# Patient Record
Sex: MALE | Race: WHITE | NOT HISPANIC OR LATINO
[De-identification: names, ages, dates, MRNs, and addresses within clinical notes are randomized per-mention and may not be internally consistent; named-entity substitution may affect disease eponyms.]

---

## 2017-06-27 ENCOUNTER — APPOINTMENT (OUTPATIENT)
Dept: INFECTIOUS DISEASE | Facility: CLINIC | Age: 72
End: 2017-06-27

## 2017-06-27 VITALS
WEIGHT: 216 LBS | OXYGEN SATURATION: 95 % | HEART RATE: 62 BPM | TEMPERATURE: 97.3 F | SYSTOLIC BLOOD PRESSURE: 168 MMHG | DIASTOLIC BLOOD PRESSURE: 69 MMHG | HEIGHT: 70.5 IN | BODY MASS INDEX: 30.58 KG/M2

## 2017-06-27 DIAGNOSIS — Z87.891 PERSONAL HISTORY OF NICOTINE DEPENDENCE: ICD-10-CM

## 2017-06-27 DIAGNOSIS — Z86.79 PERSONAL HISTORY OF OTHER DISEASES OF THE CIRCULATORY SYSTEM: ICD-10-CM

## 2017-06-27 RX ORDER — TAMSULOSIN HYDROCHLORIDE 0.4 MG/1
CAPSULE ORAL
Refills: 0 | Status: ACTIVE | COMMUNITY

## 2017-06-27 RX ORDER — METOPROLOL TARTRATE 50 MG/1
TABLET ORAL
Refills: 0 | Status: ACTIVE | COMMUNITY

## 2017-06-27 RX ORDER — ASPIRIN 81 MG
81 TABLET, DELAYED RELEASE (ENTERIC COATED) ORAL
Refills: 0 | Status: ACTIVE | COMMUNITY

## 2017-06-29 ENCOUNTER — MEDICATION RENEWAL (OUTPATIENT)
Age: 72
End: 2017-06-29

## 2017-06-29 ENCOUNTER — RX RENEWAL (OUTPATIENT)
Age: 72
End: 2017-06-29

## 2017-07-28 ENCOUNTER — RX RENEWAL (OUTPATIENT)
Age: 72
End: 2017-07-28

## 2017-07-28 ENCOUNTER — MEDICATION RENEWAL (OUTPATIENT)
Age: 72
End: 2017-07-28

## 2017-08-23 ENCOUNTER — OUTPATIENT (OUTPATIENT)
Dept: OUTPATIENT SERVICES | Facility: HOSPITAL | Age: 72
LOS: 1 days | End: 2017-08-23
Payer: MEDICARE

## 2017-08-23 ENCOUNTER — APPOINTMENT (OUTPATIENT)
Dept: CT IMAGING | Facility: IMAGING CENTER | Age: 72
End: 2017-08-23
Payer: MEDICARE

## 2017-08-23 DIAGNOSIS — Z95.4 PRESENCE OF OTHER HEART-VALVE REPLACEMENT: Chronic | ICD-10-CM

## 2017-08-23 DIAGNOSIS — Z98.89 OTHER SPECIFIED POSTPROCEDURAL STATES: Chronic | ICD-10-CM

## 2017-08-23 DIAGNOSIS — Z00.8 ENCOUNTER FOR OTHER GENERAL EXAMINATION: ICD-10-CM

## 2017-08-23 PROCEDURE — G0297: CPT | Mod: 26

## 2017-08-23 PROCEDURE — G0297: CPT

## 2017-08-25 ENCOUNTER — APPOINTMENT (OUTPATIENT)
Dept: INFECTIOUS DISEASE | Facility: CLINIC | Age: 72
End: 2017-08-25
Payer: MEDICARE

## 2017-08-25 VITALS
SYSTOLIC BLOOD PRESSURE: 152 MMHG | TEMPERATURE: 97.1 F | HEART RATE: 67 BPM | DIASTOLIC BLOOD PRESSURE: 67 MMHG | BODY MASS INDEX: 29.85 KG/M2 | OXYGEN SATURATION: 94 % | RESPIRATION RATE: 16 BRPM | WEIGHT: 211 LBS

## 2017-08-25 PROCEDURE — 99214 OFFICE O/P EST MOD 30 MIN: CPT

## 2017-11-24 ENCOUNTER — RX RENEWAL (OUTPATIENT)
Age: 72
End: 2017-11-24

## 2017-12-06 ENCOUNTER — APPOINTMENT (OUTPATIENT)
Dept: INFECTIOUS DISEASE | Facility: CLINIC | Age: 72
End: 2017-12-06
Payer: MEDICARE

## 2017-12-06 VITALS
DIASTOLIC BLOOD PRESSURE: 65 MMHG | BODY MASS INDEX: 30.86 KG/M2 | HEART RATE: 63 BPM | HEIGHT: 70.5 IN | OXYGEN SATURATION: 94 % | RESPIRATION RATE: 18 BRPM | SYSTOLIC BLOOD PRESSURE: 143 MMHG | TEMPERATURE: 98.4 F | WEIGHT: 218 LBS

## 2017-12-06 PROCEDURE — 99213 OFFICE O/P EST LOW 20 MIN: CPT

## 2018-03-06 ENCOUNTER — APPOINTMENT (OUTPATIENT)
Dept: INFECTIOUS DISEASE | Facility: CLINIC | Age: 73
End: 2018-03-06
Payer: MEDICARE

## 2018-03-06 VITALS
HEIGHT: 60 IN | DIASTOLIC BLOOD PRESSURE: 63 MMHG | WEIGHT: 217 LBS | TEMPERATURE: 98.1 F | SYSTOLIC BLOOD PRESSURE: 146 MMHG | BODY MASS INDEX: 42.6 KG/M2 | HEART RATE: 70 BPM

## 2018-03-06 DIAGNOSIS — R76.11 NONSPECIFIC REACTION TO TUBERCULIN SKIN TEST W/OUT ACTIVE TUBERCULOSIS: ICD-10-CM

## 2018-03-06 PROCEDURE — 99214 OFFICE O/P EST MOD 30 MIN: CPT

## 2018-03-06 RX ORDER — ISONIAZID 300 MG/1
300 TABLET ORAL DAILY
Qty: 30 | Refills: 2 | Status: DISCONTINUED | COMMUNITY
Start: 2017-06-29 | End: 2018-03-06

## 2018-11-12 ENCOUNTER — APPOINTMENT (OUTPATIENT)
Dept: VASCULAR SURGERY | Facility: CLINIC | Age: 73
End: 2018-11-12

## 2018-12-05 ENCOUNTER — APPOINTMENT (OUTPATIENT)
Dept: VASCULAR SURGERY | Facility: CLINIC | Age: 73
End: 2018-12-05

## 2018-12-19 ENCOUNTER — APPOINTMENT (OUTPATIENT)
Dept: VASCULAR SURGERY | Facility: CLINIC | Age: 73
End: 2018-12-19

## 2019-04-26 ENCOUNTER — APPOINTMENT (OUTPATIENT)
Dept: ORTHOPEDIC SURGERY | Facility: CLINIC | Age: 74
End: 2019-04-26
Payer: MEDICARE

## 2019-04-26 VITALS
BODY MASS INDEX: 31.35 KG/M2 | DIASTOLIC BLOOD PRESSURE: 68 MMHG | HEIGHT: 70 IN | WEIGHT: 219 LBS | SYSTOLIC BLOOD PRESSURE: 173 MMHG | HEART RATE: 61 BPM

## 2019-04-26 DIAGNOSIS — M47.816 SPONDYLOSIS W/OUT MYELOPATHY OR RADICULOPATHY, LUMBAR REGION: ICD-10-CM

## 2019-04-26 DIAGNOSIS — M54.16 RADICULOPATHY, LUMBAR REGION: ICD-10-CM

## 2019-04-26 PROCEDURE — 99204 OFFICE O/P NEW MOD 45 MIN: CPT

## 2019-04-26 NOTE — DISCUSSION/SUMMARY
[de-identified] : Lumbar radiculopathy and signs and symptoms of spinal stenosis.\par We discussed all options.\par Sending for MRI lumbar spine. \par F/U 1 week to review. \par All options discussed including rest, medicine, home exercise, acupuncture, Chiropractic care, Physical Therapy, Pain management, and last resort surgery.\par All questions were answered, all alternatives discussed and the patient is in complete agreement with that plan. Follow-up appointment as instructed. Any issues and the patient will call or come in sooner. \par Lumbar radiculopathy and signs and symptoms of spinal stenosis.\par We discussed all options.\par Continue with Chiropractic care.\par Thank you for the consultation.

## 2019-04-26 NOTE — CONSULT LETTER
[Dear  ___] : Dear ~DENN[place], [Consult Letter:] : I had the pleasure of evaluating your patient, [unfilled]. [Please see my note below.] : Please see my note below. [Consult Closing:] : Thank you very much for allowing me to participate in the care of this patient.  If you have any questions, please do not hesitate to contact me. [Sincerely,] : Sincerely, [FreeTextEntry2] : MARNI DIXON\par \par Casey Camacho\par Chiropractor\par Address: [id number] [place] [address] [address] [place] [address]\par Phone: [phone] [FreeTextEntry3] : Levar Horan MD

## 2019-04-26 NOTE — PHYSICAL EXAM
[UE/LE] : Motor: 5/5 motor strength in bilateral upper & lower extremities [ALL] : dorsalis pedis, posterior tibial, femoral, popliteal, and radial 2+ and symmetric bilaterally [Normal] : Oriented to person, place, and time, insight and judgement were intact and the affect was normal [Poor Appearance] : well-appearing [Acute Distress] : not in acute distress [de-identified] : 5 out of 5 motor strength, sensation is intact and symmetrical full range of motion flexion extension and rotation, no palpatory tenderness full range of motion of hips knees shoulders and elbows (all four extremities), no atrophy, negative straight leg raise, no pathological reflexes, no swelling, normal ambulation, no apparent distress skin is intact, no palpable lymph nodes, no upper or lower extremity instability, alert and oriented x3 and normal mood. Normal finger-to nose test.  [de-identified] : AP/lat lumbar spine 04/26/2019:Revealed degenerative disc disease lumbar spine with a grade 1 spondylolisthesis at L4-5 with minimal hip arthritis-reviewed with the patient

## 2019-04-26 NOTE — ADDENDUM
[FreeTextEntry1] : This note was authored by Justine Garcia working as a medical scribe for Dr. Levar Horan. The note was reviewed, edited, and revised by Dr. Levar Horan whom is in agreement with the exam findings, imaging findings, and treatment plan. 04/26/2019.

## 2019-04-26 NOTE — HISTORY OF PRESENT ILLNESS
[Worsening] : worsening [de-identified] : Difficulty ambulating\par c/o LBP into both legs into the feet.\par Can't walk more than half mile. \par Chiropractic care helps little. \par He is getting worse and worse with decreased ambulation.\par Also c/o cervical spine into bilateral upper extremity radiculopathy - tingling and numbness. \par No fever chills sweats nausea vomiting no bowel or bladder dysfunction, no recent weight loss or gain no night pain. This history is in addition to the intake form that I personally reviewed.

## 2019-05-06 ENCOUNTER — APPOINTMENT (OUTPATIENT)
Dept: ORTHOPEDIC SURGERY | Facility: CLINIC | Age: 74
End: 2019-05-06
Payer: MEDICARE

## 2019-05-06 DIAGNOSIS — M54.6 PAIN IN THORACIC SPINE: ICD-10-CM

## 2019-05-06 DIAGNOSIS — M48.061 SPINAL STENOSIS, LUMBAR REGION WITHOUT NEUROGENIC CLAUDICATION: ICD-10-CM

## 2019-05-06 PROCEDURE — 99214 OFFICE O/P EST MOD 30 MIN: CPT

## 2019-05-06 NOTE — PHYSICAL EXAM
[UE/LE] : Sensory: Intact in bilateral upper & lower extremities [ALL] : dorsalis pedis, posterior tibial, femoral, popliteal, and radial 2+ and symmetric bilaterally [Normal] : Oriented to person, place, and time, insight and judgement were intact and the affect was normal [Poor Appearance] : well-appearing [Acute Distress] : not in acute distress [de-identified] : MRI lumbar at [facility] on 04/26/2019: Spinal malalignment measuring 9 mm with marked multilevel degenerative disc disease including herniations and synovial cyst. \par \par AP/lat lumbar spine 04/26/2019:Revealed degenerative disc disease lumbar spine with a grade 1 spondylolisthesis at L4-5 with minimal hip arthritis-reviewed with the patient  [de-identified] : 5 out of 5 motor strength, sensation is intact and symmetrical full range of motion flexion extension and rotation, no palpatory tenderness full range of motion of hips knees shoulders and elbows (all four extremities), no atrophy, negative straight leg raise, no pathological reflexes, no swelling, normal ambulation, no apparent distress skin is intact, no palpable lymph nodes, no upper or lower extremity instability, alert and oriented x3 and normal mood. Normal finger-to nose test. No upper motor neuron findings.

## 2019-05-06 NOTE — DISCUSSION/SUMMARY
[de-identified] : Lumbar radiculopathy and signs and symptoms of spinal stenosis.\par Referred to  Dr. Bass and Poonam/Fabio for KOURTNEY. \par 5 is now developing mid back pain arm pain neck pain I would like him to be evaluated by a neurologist. He has no signs or symptoms of myelopathy.\par We discussed all options.\par All options discussed including rest, medicine, home exercise, acupuncture, Chiropractic care, Physical Therapy, Pain management, and last resort surgery.\par All questions were answered, all alternatives discussed and the patient is in complete agreement with that plan. Follow-up appointment as instructed. Any issues and the patient will call or come in sooner. \par Lumbar radiculopathy and signs and symptoms of spinal stenosis.\par We discussed all options.\par Followup after the neurologic evaluation which will probably be followed by the pain management consultation.\par Thank you for the consultation.

## 2019-05-06 NOTE — ADDENDUM
[FreeTextEntry1] : This note was authored by Justine Garcia working as a medical scribe for Dr. Levar Horan. The note was reviewed, edited, and revised by Dr. Levar Horan whom is in agreement with the exam findings, imaging findings, and treatment plan. 05/06/2019.

## 2019-05-06 NOTE — HISTORY OF PRESENT ILLNESS
[Stable] : stable [de-identified] : Here to review lumbar MRI\par Difficulty ambulating. \par c/o LBP into both legs into the feet.\par Can't walk more than half mile. \par Chiropractic care helps little. \par He is getting worse and worse with decreased ambulation.\par Also c/o cervical spine into bilateral upper extremity radiculopathy - tingling and numbness. \par Not currently on an anticoagulant. \par Recent dx with bladder CA. \par No fever chills sweats nausea vomiting no bowel or bladder dysfunction, no recent weight loss or gain no night pain. This history is in addition to the intake form that I personally reviewed.

## 2019-06-24 ENCOUNTER — APPOINTMENT (OUTPATIENT)
Dept: ORTHOPEDIC SURGERY | Facility: CLINIC | Age: 74
End: 2019-06-24
Payer: MEDICARE

## 2019-06-24 VITALS
HEART RATE: 99 BPM | HEIGHT: 70 IN | BODY MASS INDEX: 30.06 KG/M2 | SYSTOLIC BLOOD PRESSURE: 146 MMHG | WEIGHT: 210 LBS | DIASTOLIC BLOOD PRESSURE: 96 MMHG

## 2019-06-24 PROCEDURE — 99214 OFFICE O/P EST MOD 30 MIN: CPT

## 2019-07-29 ENCOUNTER — APPOINTMENT (OUTPATIENT)
Dept: ORTHOPEDIC SURGERY | Facility: CLINIC | Age: 74
End: 2019-07-29

## 2019-09-04 ENCOUNTER — APPOINTMENT (OUTPATIENT)
Dept: ORTHOPEDIC SURGERY | Facility: CLINIC | Age: 74
End: 2019-09-04

## 2019-12-06 ENCOUNTER — APPOINTMENT (OUTPATIENT)
Dept: ORTHOPEDIC SURGERY | Facility: CLINIC | Age: 74
End: 2019-12-06
Payer: MEDICARE

## 2019-12-06 PROCEDURE — 99214 OFFICE O/P EST MOD 30 MIN: CPT

## 2019-12-06 NOTE — HISTORY OF PRESENT ILLNESS
[Worsening] : worsening [de-identified] : 73 y/o male presents for follow up of Lumbar radiculopathy/stenosis. Patient was previously referred to Dr. Bass and Poonam/Fabio for KOURTNEY. He states he saw Dr. Bass and Poonam. He states he was referred for lumbar MRI at [facility]. Patient states he has not had any epidural injections. 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 He had chemo in June and July. \par He states that this has been ongoing but his oncologist has cleared him for spinal surgery.\par He presents to discuss the procedure.\par Hx: Lumbar MRI done.\par Difficulty ambulating. \par getting worse\par cleared after bladder cancer\par c/o LBP into both legs into the feet.\par Can't walk more than half mile. \par Chiropractic care helps little. \par He is getting worse and worse with decreased ambulation.\par Also c/o cervical spine into bilateral upper extremity radiculopathy - tingling and numbness. \par Not currently on an anticoagulant. \par Recent dx with bladder CA. Cleared.\par No fever chills sweats nausea vomiting no bowel or bladder dysfunction, no recent weight loss or gain no night pain. This history is in addition to the intake form that I personally reviewed. \par \par He states the symptoms are stable. \par

## 2019-12-06 NOTE — PHYSICAL EXAM
[Antalgic] : antalgic [UE/LE] : Motor: 5/5 motor strength in bilateral upper & lower extremities [ALL] : Biceps, brachioradialis, triceps, patellar, ankle and plantar 2+ and symmetric bilaterally [Normal] : Oriented to person, place, and time, insight and judgement were intact and the affect was normal [Acute Distress] : not in acute distress [Poor Appearance] : well-appearing [de-identified] : 5 out of 5 motor strength, sensation is intact and symmetrical full range of motion flexion extension and rotation, no palpatory tenderness full range of motion of hips knees shoulders and elbows (all four extremities), no atrophy, negative straight leg raise, no pathological reflexes, no swelling, normal ambulation, no apparent distress skin is intact, no palpable lymph nodes, no upper or lower extremity instability, alert and oriented x3 and normal mood. Normal finger-to nose test.  [de-identified] : MRI lumbar at [facility] on 04/26/2019: Spinal malalignment measuring 9 mm with marked multilevel degenerative disc disease including herniations and synovial cyst. \par \par AP/lat lumbar spine 04/26/2019:Revealed degenerative disc disease lumbar spine with a grade 1 spondylolisthesis at L4-5 with minimal hip arthritis-reviewed with the patient. \par

## 2019-12-06 NOTE — DISCUSSION/SUMMARY
[Surgical risks reviewed] : Surgical risks reviewed [de-identified] : Patient is suffering from lumbar spinal stenosis.\par He has decreased ambulation.\par He is miserable and worsening.\par He would like to undergo surgical intervention.\par He is undergoing chemotherapy for bladder cancer.\par He would need clearance from his oncologist for when he can undergo surgery.\par This maybe weeks after chemotherapy to decrease the chance of infection.\par Follow up in 6 weeks.\par Surgery will involve an L3-5 decompression.\par Risks of surgery include infection, dural tear, nerve root injury, reherniation, future back pain, future leg pain, retained fragment, hematoma, urinary retention, worsening leg symptoms, footdrop, anesthetic risks, blood transfusion risks, positioning pain, visceral and vascular injury, deep vein thrombosis, pulmonary embolus, and death. Patient will need spinal orthosis pre and post operatively. All risks were explained not exclusive to the ones mentioned alternatives were discussed and all questions were answered the patient agrees and understands the above and is in complete agreement with the plan. \par All questions were answered, all alternatives discussed and the patient is in complete agreement with that plan. Follow-up appointment as instructed. Any issues and the patient will call or come in sooner. \par

## 2019-12-06 NOTE — ADDENDUM
[FreeTextEntry1] : This note was authored by Justine Garcia working as a medical scribe for Dr. Levar Horan. The note was reviewed, edited, and revised by Dr. Levar Horan whom is in agreement with the exam findings, imaging findings, and treatment plan. Dec 06, 2019

## 2020-01-14 ENCOUNTER — OUTPATIENT (OUTPATIENT)
Dept: OUTPATIENT SERVICES | Facility: HOSPITAL | Age: 75
LOS: 1 days | End: 2020-01-14

## 2020-01-14 VITALS
DIASTOLIC BLOOD PRESSURE: 76 MMHG | WEIGHT: 210.1 LBS | RESPIRATION RATE: 16 BRPM | OXYGEN SATURATION: 98 % | TEMPERATURE: 98 F | SYSTOLIC BLOOD PRESSURE: 130 MMHG | HEIGHT: 70 IN

## 2020-01-14 DIAGNOSIS — M48.061 SPINAL STENOSIS, LUMBAR REGION WITHOUT NEUROGENIC CLAUDICATION: ICD-10-CM

## 2020-01-14 DIAGNOSIS — Z01.818 ENCOUNTER FOR OTHER PREPROCEDURAL EXAMINATION: ICD-10-CM

## 2020-01-14 DIAGNOSIS — Z95.2 PRESENCE OF PROSTHETIC HEART VALVE: ICD-10-CM

## 2020-01-14 DIAGNOSIS — Z98.89 OTHER SPECIFIED POSTPROCEDURAL STATES: Chronic | ICD-10-CM

## 2020-01-14 DIAGNOSIS — G47.30 SLEEP APNEA, UNSPECIFIED: ICD-10-CM

## 2020-01-14 DIAGNOSIS — M48.07 SPINAL STENOSIS, LUMBOSACRAL REGION: ICD-10-CM

## 2020-01-14 DIAGNOSIS — Z95.4 PRESENCE OF OTHER HEART-VALVE REPLACEMENT: Chronic | ICD-10-CM

## 2020-01-14 DIAGNOSIS — C67.9 MALIGNANT NEOPLASM OF BLADDER, UNSPECIFIED: Chronic | ICD-10-CM

## 2020-01-14 LAB
ANION GAP SERPL CALC-SCNC: 15 MMO/L — HIGH (ref 7–14)
BLD GP AB SCN SERPL QL: NEGATIVE — SIGNIFICANT CHANGE UP
BUN SERPL-MCNC: 18 MG/DL — SIGNIFICANT CHANGE UP (ref 7–23)
CALCIUM SERPL-MCNC: 9.8 MG/DL — SIGNIFICANT CHANGE UP (ref 8.4–10.5)
CHLORIDE SERPL-SCNC: 103 MMOL/L — SIGNIFICANT CHANGE UP (ref 98–107)
CO2 SERPL-SCNC: 23 MMOL/L — SIGNIFICANT CHANGE UP (ref 22–31)
CREAT SERPL-MCNC: 1.2 MG/DL — SIGNIFICANT CHANGE UP (ref 0.5–1.3)
GLUCOSE SERPL-MCNC: 93 MG/DL — SIGNIFICANT CHANGE UP (ref 70–99)
HCT VFR BLD CALC: 43.3 % — SIGNIFICANT CHANGE UP (ref 39–50)
HGB BLD-MCNC: 13.9 G/DL — SIGNIFICANT CHANGE UP (ref 13–17)
MCHC RBC-ENTMCNC: 30 PG — SIGNIFICANT CHANGE UP (ref 27–34)
MCHC RBC-ENTMCNC: 32.1 % — SIGNIFICANT CHANGE UP (ref 32–36)
MCV RBC AUTO: 93.3 FL — SIGNIFICANT CHANGE UP (ref 80–100)
NRBC # FLD: 0 K/UL — SIGNIFICANT CHANGE UP (ref 0–0)
PLATELET # BLD AUTO: 137 K/UL — LOW (ref 150–400)
PMV BLD: 12.5 FL — SIGNIFICANT CHANGE UP (ref 7–13)
POTASSIUM SERPL-MCNC: 4.5 MMOL/L — SIGNIFICANT CHANGE UP (ref 3.5–5.3)
POTASSIUM SERPL-SCNC: 4.5 MMOL/L — SIGNIFICANT CHANGE UP (ref 3.5–5.3)
RBC # BLD: 4.64 M/UL — SIGNIFICANT CHANGE UP (ref 4.2–5.8)
RBC # FLD: 14 % — SIGNIFICANT CHANGE UP (ref 10.3–14.5)
RH IG SCN BLD-IMP: NEGATIVE — SIGNIFICANT CHANGE UP
SODIUM SERPL-SCNC: 141 MMOL/L — SIGNIFICANT CHANGE UP (ref 135–145)
WBC # BLD: 5.57 K/UL — SIGNIFICANT CHANGE UP (ref 3.8–10.5)
WBC # FLD AUTO: 5.57 K/UL — SIGNIFICANT CHANGE UP (ref 3.8–10.5)

## 2020-01-14 NOTE — H&P PST ADULT - ASSESSMENT
73 y/o male with PMH bladder cancer, s/p TURBT x3, in 2019, and course of chemo which completed 7/2019.  Pt with hx of lower back pain since 1975, worsened in past year. Dx with lumbar spinal stenosis, and spondylolisthesis.  Scheduled for L3-L4, L4-L5 Lumbar Laminectomy with fusion 1/28/20.

## 2020-01-14 NOTE — H&P PST ADULT - CARDIOVASCULAR COMMENTS
Hx of aortic valve replacement with bovine valve 2010.  Pt on low dose ASA.   I soke with Dr. Aung Rao's office.  Dr. Horan would prefer pt to come off of his ASA prior to surgery, final decision is up to cardiologist per Maira.  Pt to see Dr. Johnson prior to surgery

## 2020-01-14 NOTE — H&P PST ADULT - NSICDXFAMILYHX_GEN_ALL_CORE_FT
FAMILY HISTORY:  FH: stroke, mother had stroke, cerebral hemorrhage.  brother had cerebral hemorrhage

## 2020-01-14 NOTE — H&P PST ADULT - HISTORY OF PRESENT ILLNESS
75 y/o male with PMH bladder cancer, s/p TURBT x3, in 2019, and course of chemo which completed 7/2019.  Pt with hx of lower back pain since 1975, worsened in past year. Dx with lumbar spinal stenosis, and spondylolisthesis.  Scheduled for L3-L4, L4-L5 Lumbar Laminectomy with fusion 1/28/20.

## 2020-01-14 NOTE — H&P PST ADULT - VISION (WITH CORRECTIVE LENSES IF THE PATIENT USUALLY WEARS THEM):
Normal vision: sees adequately in most situations; can see medication labels, newsprint/wars glasses

## 2020-01-14 NOTE — H&P PST ADULT - NSICDXPASTSURGICALHX_GEN_ALL_CORE_FT
PAST SURGICAL HISTORY:  Aortic valve replaced Bovine - Walters Model # 2700TFX  2012    S/P colon resection 2000 - Diverticulitis PAST SURGICAL HISTORY:  Aortic valve replaced Bovine - Walters Model # [id number]  2012    Bladder cancer TURBT x3 2019    S/P colon resection 2000 - Diverticulitis

## 2020-01-14 NOTE — H&P PST ADULT - NSICDXPASTMEDICALHX_GEN_ALL_CORE_FT
PAST MEDICAL HISTORY:  Aortic valve defect aortic valve replacement 2010 (bovine)    Cervical disc disorder     Cholelithiasis     Diverticulitis     GERD (gastroesophageal reflux disease)     HLD (hyperlipidemia)     HTN (hypertension)     Lumbar disc disease     Obesity     Pancreatitis Hospitalized 2009    Sleep apnea syndrome     Smoking history quit 2012 - 54 yrs PAST MEDICAL HISTORY:  Aortic valve defect aortic valve replacement 2010 (bovine)    Cervical disc disorder     Cholelithiasis     Chronic GERD     Diverticulitis     GERD (gastroesophageal reflux disease)     HLD (hyperlipidemia)     HTN (hypertension)     Lumbar disc disease     Obesity     Obesity     Pancreatitis Hospitalized 2009    Sleep apnea syndrome     Smoking history quit 2012 - 54 yrs

## 2020-01-14 NOTE — H&P PST ADULT - NEGATIVE GENERAL GENITOURINARY SYMPTOMS
no dysuria/no hematuria/normal urinary frequency no hematuria/no flank pain L/no dysuria/normal urinary frequency/no flank pain R

## 2020-01-14 NOTE — H&P PST ADULT - NSICDXPROBLEM_GEN_ALL_CORE_FT
PROBLEM DIAGNOSES  Problem: Spinal stenosis, lumbar  Assessment and Plan:  L3-L4, L4-L5 Lumbar Laminectomy with fusion 1/28/20.   CBc BMP T&S nasal culture  Preop instructions and antibacterial soap given and explained (verbal and written), with teach back.   Pt reports he has appt with pMD for pre-op as instructed by Surgeon    Problem: H/O aortic valve replacement  Assessment and Plan: OR booking informed.   Pt to see Cardiolgoist for pre-op eval and for instructions f\regarding pre-op asa.  I spoke with Dr. Aung Rao's office.  Dr. Horan would prefer pt to come off of ASA prior to surgery, final decision is up to Cardiolgoist, per Maira.  Pt to sched appt with dr. Johnson    Problem: Sleep apnea  Assessment and Plan: OR booking informed

## 2020-01-16 LAB — SPECIMEN SOURCE: SIGNIFICANT CHANGE UP

## 2020-01-17 LAB — BACTERIA NPH CULT: SIGNIFICANT CHANGE UP

## 2020-01-27 NOTE — ASU PATIENT PROFILE, ADULT - PSH
Aortic valve replaced  Bovine - Walters Model # 2700TFX  2012  Bladder cancer  TURBT x3 2019  S/P colon resection  2000 - Diverticulitis

## 2020-01-27 NOTE — ASU PATIENT PROFILE, ADULT - VISION (WITH CORRECTIVE LENSES IF THE PATIENT USUALLY WEARS THEM):
Normal vision: sees adequately in most situations; can see medication labels, newsprint/wars glasses Partially impaired: cannot see medication labels or newsprint, but can see obstacles in path, and the surrounding layout; can count fingers at arm's length/wears glasses

## 2020-01-27 NOTE — ASU PATIENT PROFILE, ADULT - PMH
Aortic valve defect  aortic valve replacement 2010 (bovine)  Cervical disc disorder    Cholelithiasis    Chronic GERD    Diverticulitis    GERD (gastroesophageal reflux disease)    HLD (hyperlipidemia)    HTN (hypertension)    Lumbar disc disease    Obesity    Obesity    Pancreatitis  Hospitalized 2009  Sleep apnea syndrome    Smoking history  quit 2012 - 54 yrs

## 2020-01-28 ENCOUNTER — APPOINTMENT (OUTPATIENT)
Dept: ORTHOPEDIC SURGERY | Facility: HOSPITAL | Age: 75
End: 2020-01-28
Payer: MEDICARE

## 2020-01-28 ENCOUNTER — INPATIENT (INPATIENT)
Facility: HOSPITAL | Age: 75
LOS: 1 days | Discharge: HOME | End: 2020-01-30
Attending: ORTHOPAEDIC SURGERY | Admitting: ORTHOPAEDIC SURGERY
Payer: MEDICARE

## 2020-01-28 ENCOUNTER — RESULT REVIEW (OUTPATIENT)
Age: 75
End: 2020-01-28

## 2020-01-28 VITALS
SYSTOLIC BLOOD PRESSURE: 135 MMHG | HEIGHT: 70 IN | RESPIRATION RATE: 16 BRPM | DIASTOLIC BLOOD PRESSURE: 55 MMHG | WEIGHT: 210.1 LBS | TEMPERATURE: 99 F | OXYGEN SATURATION: 93 % | HEART RATE: 79 BPM

## 2020-01-28 DIAGNOSIS — Z98.89 OTHER SPECIFIED POSTPROCEDURAL STATES: Chronic | ICD-10-CM

## 2020-01-28 DIAGNOSIS — C67.9 MALIGNANT NEOPLASM OF BLADDER, UNSPECIFIED: Chronic | ICD-10-CM

## 2020-01-28 DIAGNOSIS — M48.07 SPINAL STENOSIS, LUMBOSACRAL REGION: ICD-10-CM

## 2020-01-28 DIAGNOSIS — Z95.4 PRESENCE OF OTHER HEART-VALVE REPLACEMENT: Chronic | ICD-10-CM

## 2020-01-28 LAB
ANION GAP SERPL CALC-SCNC: 14 MMO/L — SIGNIFICANT CHANGE UP (ref 7–14)
BASOPHILS # BLD AUTO: 0.03 K/UL — SIGNIFICANT CHANGE UP (ref 0–0.2)
BASOPHILS NFR BLD AUTO: 0.2 % — SIGNIFICANT CHANGE UP (ref 0–2)
BUN SERPL-MCNC: 27 MG/DL — HIGH (ref 7–23)
CALCIUM SERPL-MCNC: 8.3 MG/DL — LOW (ref 8.4–10.5)
CHLORIDE SERPL-SCNC: 102 MMOL/L — SIGNIFICANT CHANGE UP (ref 98–107)
CO2 SERPL-SCNC: 20 MMOL/L — LOW (ref 22–31)
CREAT SERPL-MCNC: 1.8 MG/DL — HIGH (ref 0.5–1.3)
EOSINOPHIL # BLD AUTO: 0.01 K/UL — SIGNIFICANT CHANGE UP (ref 0–0.5)
EOSINOPHIL NFR BLD AUTO: 0.1 % — SIGNIFICANT CHANGE UP (ref 0–6)
GLUCOSE SERPL-MCNC: 166 MG/DL — HIGH (ref 70–99)
HCT VFR BLD CALC: 44.1 % — SIGNIFICANT CHANGE UP (ref 39–50)
HGB BLD-MCNC: 14.1 G/DL — SIGNIFICANT CHANGE UP (ref 13–17)
IMM GRANULOCYTES NFR BLD AUTO: 0.6 % — SIGNIFICANT CHANGE UP (ref 0–1.5)
LYMPHOCYTES # BLD AUTO: 0.5 K/UL — LOW (ref 1–3.3)
LYMPHOCYTES # BLD AUTO: 3.6 % — LOW (ref 13–44)
MCHC RBC-ENTMCNC: 29.9 PG — SIGNIFICANT CHANGE UP (ref 27–34)
MCHC RBC-ENTMCNC: 32 % — SIGNIFICANT CHANGE UP (ref 32–36)
MCV RBC AUTO: 93.6 FL — SIGNIFICANT CHANGE UP (ref 80–100)
MONOCYTES # BLD AUTO: 0.13 K/UL — SIGNIFICANT CHANGE UP (ref 0–0.9)
MONOCYTES NFR BLD AUTO: 0.9 % — LOW (ref 2–14)
NEUTROPHILS # BLD AUTO: 13.22 K/UL — HIGH (ref 1.8–7.4)
NEUTROPHILS NFR BLD AUTO: 94.6 % — HIGH (ref 43–77)
NRBC # FLD: 0 K/UL — SIGNIFICANT CHANGE UP (ref 0–0)
PLATELET # BLD AUTO: 193 K/UL — SIGNIFICANT CHANGE UP (ref 150–400)
PMV BLD: 12.9 FL — SIGNIFICANT CHANGE UP (ref 7–13)
POTASSIUM SERPL-MCNC: 4.6 MMOL/L — SIGNIFICANT CHANGE UP (ref 3.5–5.3)
POTASSIUM SERPL-SCNC: 4.6 MMOL/L — SIGNIFICANT CHANGE UP (ref 3.5–5.3)
RBC # BLD: 4.71 M/UL — SIGNIFICANT CHANGE UP (ref 4.2–5.8)
RBC # FLD: 13.9 % — SIGNIFICANT CHANGE UP (ref 10.3–14.5)
SODIUM SERPL-SCNC: 136 MMOL/L — SIGNIFICANT CHANGE UP (ref 135–145)
WBC # BLD: 13.97 K/UL — HIGH (ref 3.8–10.5)
WBC # FLD AUTO: 13.97 K/UL — HIGH (ref 3.8–10.5)

## 2020-01-28 PROCEDURE — 63047 LAM FACETEC & FORAMOT LUMBAR: CPT | Mod: 59

## 2020-01-28 PROCEDURE — 22840 INSERT SPINE FIXATION DEVICE: CPT

## 2020-01-28 PROCEDURE — 72100 X-RAY EXAM L-S SPINE 2/3 VWS: CPT | Mod: 26

## 2020-01-28 PROCEDURE — 88311 DECALCIFY TISSUE: CPT | Mod: 26

## 2020-01-28 PROCEDURE — 63267 EXCISE INTRSPINL LESION LMBR: CPT | Mod: 82

## 2020-01-28 PROCEDURE — 63048 LAM FACETEC &FORAMOT EA ADDL: CPT | Mod: 82,59

## 2020-01-28 PROCEDURE — 63048 LAM FACETEC &FORAMOT EA ADDL: CPT

## 2020-01-28 PROCEDURE — 63047 LAM FACETEC & FORAMOT LUMBAR: CPT | Mod: 82,59

## 2020-01-28 PROCEDURE — 22840 INSERT SPINE FIXATION DEVICE: CPT | Mod: 82,59

## 2020-01-28 PROCEDURE — 88304 TISSUE EXAM BY PATHOLOGIST: CPT | Mod: 26

## 2020-01-28 PROCEDURE — 22612 ARTHRD PST TQ 1NTRSPC LUMBAR: CPT

## 2020-01-28 PROCEDURE — 63267 EXCISE INTRSPINL LESION LMBR: CPT

## 2020-01-28 PROCEDURE — 22612 ARTHRD PST TQ 1NTRSPC LUMBAR: CPT | Mod: 82

## 2020-01-28 RX ORDER — PANTOPRAZOLE SODIUM 20 MG/1
40 TABLET, DELAYED RELEASE ORAL
Refills: 0 | Status: DISCONTINUED | OUTPATIENT
Start: 2020-01-28 | End: 2020-01-30

## 2020-01-28 RX ORDER — POLYETHYLENE GLYCOL 3350 17 G/17G
17 POWDER, FOR SOLUTION ORAL EVERY 12 HOURS
Refills: 0 | Status: DISCONTINUED | OUTPATIENT
Start: 2020-01-28 | End: 2020-01-30

## 2020-01-28 RX ORDER — DIAZEPAM 5 MG
5 TABLET ORAL EVERY 8 HOURS
Refills: 0 | Status: DISCONTINUED | OUTPATIENT
Start: 2020-01-28 | End: 2020-01-28

## 2020-01-28 RX ORDER — CEFAZOLIN SODIUM 1 G
2000 VIAL (EA) INJECTION EVERY 8 HOURS
Refills: 0 | Status: COMPLETED | OUTPATIENT
Start: 2020-01-28 | End: 2020-01-28

## 2020-01-28 RX ORDER — LISINOPRIL 2.5 MG/1
20 TABLET ORAL DAILY
Refills: 0 | Status: DISCONTINUED | OUTPATIENT
Start: 2020-01-28 | End: 2020-01-28

## 2020-01-28 RX ORDER — AMLODIPINE BESYLATE 2.5 MG/1
5 TABLET ORAL DAILY
Refills: 0 | Status: DISCONTINUED | OUTPATIENT
Start: 2020-01-28 | End: 2020-01-30

## 2020-01-28 RX ORDER — HYDROMORPHONE HYDROCHLORIDE 2 MG/ML
30 INJECTION INTRAMUSCULAR; INTRAVENOUS; SUBCUTANEOUS
Refills: 0 | Status: DISCONTINUED | OUTPATIENT
Start: 2020-01-28 | End: 2020-01-29

## 2020-01-28 RX ORDER — MAGNESIUM HYDROXIDE 400 MG/1
30 TABLET, CHEWABLE ORAL EVERY 12 HOURS
Refills: 0 | Status: DISCONTINUED | OUTPATIENT
Start: 2020-01-28 | End: 2020-01-28

## 2020-01-28 RX ORDER — NALOXONE HYDROCHLORIDE 4 MG/.1ML
0.1 SPRAY NASAL
Refills: 0 | Status: DISCONTINUED | OUTPATIENT
Start: 2020-01-28 | End: 2020-01-30

## 2020-01-28 RX ORDER — ASPIRIN/CALCIUM CARB/MAGNESIUM 324 MG
81 TABLET ORAL DAILY
Refills: 0 | Status: DISCONTINUED | OUTPATIENT
Start: 2020-01-29 | End: 2020-01-30

## 2020-01-28 RX ORDER — AMLODIPINE BESYLATE 2.5 MG/1
5 TABLET ORAL DAILY
Refills: 0 | Status: DISCONTINUED | OUTPATIENT
Start: 2020-01-28 | End: 2020-01-28

## 2020-01-28 RX ORDER — FENTANYL CITRATE 50 UG/ML
50 INJECTION INTRAVENOUS
Refills: 0 | Status: DISCONTINUED | OUTPATIENT
Start: 2020-01-28 | End: 2020-01-28

## 2020-01-28 RX ORDER — ALBUMIN HUMAN 25 %
250 VIAL (ML) INTRAVENOUS ONCE
Refills: 0 | Status: COMPLETED | OUTPATIENT
Start: 2020-01-28 | End: 2020-01-28

## 2020-01-28 RX ORDER — ONDANSETRON 8 MG/1
4 TABLET, FILM COATED ORAL EVERY 6 HOURS
Refills: 0 | Status: DISCONTINUED | OUTPATIENT
Start: 2020-01-28 | End: 2020-01-30

## 2020-01-28 RX ORDER — ATORVASTATIN CALCIUM 80 MG/1
10 TABLET, FILM COATED ORAL AT BEDTIME
Refills: 0 | Status: DISCONTINUED | OUTPATIENT
Start: 2020-01-28 | End: 2020-01-30

## 2020-01-28 RX ORDER — SENNA PLUS 8.6 MG/1
2 TABLET ORAL AT BEDTIME
Refills: 0 | Status: DISCONTINUED | OUTPATIENT
Start: 2020-01-28 | End: 2020-01-30

## 2020-01-28 RX ORDER — ACETAMINOPHEN 500 MG
650 TABLET ORAL EVERY 6 HOURS
Refills: 0 | Status: DISCONTINUED | OUTPATIENT
Start: 2020-01-28 | End: 2020-01-30

## 2020-01-28 RX ORDER — SODIUM CHLORIDE 9 MG/ML
1000 INJECTION, SOLUTION INTRAVENOUS
Refills: 0 | Status: DISCONTINUED | OUTPATIENT
Start: 2020-01-28 | End: 2020-01-29

## 2020-01-28 RX ORDER — ACETAMINOPHEN 500 MG
650 TABLET ORAL EVERY 6 HOURS
Refills: 0 | Status: DISCONTINUED | OUTPATIENT
Start: 2020-01-28 | End: 2020-01-28

## 2020-01-28 RX ORDER — SODIUM CHLORIDE 9 MG/ML
1000 INJECTION, SOLUTION INTRAVENOUS ONCE
Refills: 0 | Status: COMPLETED | OUTPATIENT
Start: 2020-01-28 | End: 2020-01-28

## 2020-01-28 RX ORDER — HYDROMORPHONE HYDROCHLORIDE 2 MG/ML
0.5 INJECTION INTRAMUSCULAR; INTRAVENOUS; SUBCUTANEOUS
Refills: 0 | Status: DISCONTINUED | OUTPATIENT
Start: 2020-01-28 | End: 2020-01-28

## 2020-01-28 RX ORDER — SODIUM CHLORIDE 9 MG/ML
1000 INJECTION, SOLUTION INTRAVENOUS
Refills: 0 | Status: DISCONTINUED | OUTPATIENT
Start: 2020-01-28 | End: 2020-01-28

## 2020-01-28 RX ORDER — TAMSULOSIN HYDROCHLORIDE 0.4 MG/1
0.4 CAPSULE ORAL AT BEDTIME
Refills: 0 | Status: DISCONTINUED | OUTPATIENT
Start: 2020-01-28 | End: 2020-01-30

## 2020-01-28 RX ORDER — HYDROMORPHONE HYDROCHLORIDE 2 MG/ML
0.5 INJECTION INTRAMUSCULAR; INTRAVENOUS; SUBCUTANEOUS
Refills: 0 | Status: DISCONTINUED | OUTPATIENT
Start: 2020-01-28 | End: 2020-01-29

## 2020-01-28 RX ORDER — LISINOPRIL 2.5 MG/1
20 TABLET ORAL DAILY
Refills: 0 | Status: DISCONTINUED | OUTPATIENT
Start: 2020-01-28 | End: 2020-01-29

## 2020-01-28 RX ORDER — ONDANSETRON 8 MG/1
4 TABLET, FILM COATED ORAL ONCE
Refills: 0 | Status: DISCONTINUED | OUTPATIENT
Start: 2020-01-28 | End: 2020-01-28

## 2020-01-28 RX ORDER — FENTANYL CITRATE 50 UG/ML
25 INJECTION INTRAVENOUS
Refills: 0 | Status: DISCONTINUED | OUTPATIENT
Start: 2020-01-28 | End: 2020-01-28

## 2020-01-28 RX ADMIN — HYDROMORPHONE HYDROCHLORIDE 30 MILLILITER(S): 2 INJECTION INTRAMUSCULAR; INTRAVENOUS; SUBCUTANEOUS at 17:33

## 2020-01-28 RX ADMIN — Medication 100 MILLIGRAM(S): at 23:15

## 2020-01-28 RX ADMIN — HYDROMORPHONE HYDROCHLORIDE 30 MILLILITER(S): 2 INJECTION INTRAMUSCULAR; INTRAVENOUS; SUBCUTANEOUS at 20:18

## 2020-01-28 RX ADMIN — SODIUM CHLORIDE 100 MILLILITER(S): 9 INJECTION, SOLUTION INTRAVENOUS at 17:39

## 2020-01-28 RX ADMIN — Medication 650 MILLIGRAM(S): at 23:15

## 2020-01-28 RX ADMIN — SENNA PLUS 2 TABLET(S): 8.6 TABLET ORAL at 23:15

## 2020-01-28 RX ADMIN — Medication 250 MILLILITER(S): at 13:19

## 2020-01-28 RX ADMIN — ATORVASTATIN CALCIUM 10 MILLIGRAM(S): 80 TABLET, FILM COATED ORAL at 23:15

## 2020-01-28 RX ADMIN — Medication 100 MILLIGRAM(S): at 15:52

## 2020-01-28 RX ADMIN — TAMSULOSIN HYDROCHLORIDE 0.4 MILLIGRAM(S): 0.4 CAPSULE ORAL at 23:15

## 2020-01-28 RX ADMIN — SODIUM CHLORIDE 1000 MILLILITER(S): 9 INJECTION, SOLUTION INTRAVENOUS at 13:20

## 2020-01-28 RX ADMIN — HYDROMORPHONE HYDROCHLORIDE 30 MILLILITER(S): 2 INJECTION INTRAMUSCULAR; INTRAVENOUS; SUBCUTANEOUS at 12:50

## 2020-01-28 RX ADMIN — POLYETHYLENE GLYCOL 3350 17 GRAM(S): 17 POWDER, FOR SOLUTION ORAL at 17:35

## 2020-01-28 RX ADMIN — Medication 650 MILLIGRAM(S): at 17:36

## 2020-01-28 NOTE — PROGRESS NOTE ADULT - SUBJECTIVE AND OBJECTIVE BOX
Patient seen and examined in the PACU.  No current complaints.  Surgical pain is controlled.  Denies radiation of pain or paresthesia to the bilateral lower extremities.  Denies CP, SOB, N/V/HA.         HEALTH ISSUES - PROBLEM Dx:  Sleep apnea  H/O aortic valve replacement  Spinal stenosis, lumbar          MEDICATIONS  (STANDING):  acetaminophen   Tablet .. 650 milliGRAM(s) Oral every 6 hours  amLODIPine   Tablet 5 milliGRAM(s) Oral daily  atorvastatin 10 milliGRAM(s) Oral at bedtime  ceFAZolin   IVPB 2000 milliGRAM(s) IV Intermittent every 8 hours  HYDROmorphone PCA (1 mG/mL) 30 milliLiter(s) PCA Continuous PCA Continuous  lactated ringers. 1000 milliLiter(s) IV Continuous <Continuous>  lisinopril 20 milliGRAM(s) Oral daily  pantoprazole    Tablet 40 milliGRAM(s) Oral before breakfast  polyethylene glycol 3350 17 Gram(s) Oral every 12 hours  senna 2 Tablet(s) Oral at bedtime  tamsulosin 0.4 milliGRAM(s) Oral at bedtime      Allergies    No Known Allergies    Intolerances        PAST MEDICAL & SURGICAL HISTORY:  Obesity  Chronic GERD  Diverticulitis  Smoking history: quit 2012 - 54 yrs  Obesity  HTN (hypertension)  HLD (hyperlipidemia)  Sleep apnea syndrome  Aortic valve defect: aortic valve replacement 2010 (bovine)  Pancreatitis: Hospitalized 2009  Cervical disc disorder  Lumbar disc disease  Cholelithiasis  GERD (gastroesophageal reflux disease)  Bladder cancer: TURBT x3 2019  Aortic valve replaced: Bovine - Walters Model # 2700TFX  2012  S/P colon resection: 2000 - Diverticulitis                            14.1   13.97 )-----------( 193      ( 28 Jan 2020 12:30 )             44.1                       Vital Signs Last 24 Hrs  T(C): 36.6 (01-28-20 @ 11:45), Max: 37 (01-28-20 @ 06:03)  T(F): 97.9 (01-28-20 @ 11:45), Max: 98.6 (01-28-20 @ 06:03)  HR: 80 (01-28-20 @ 13:45) (67 - 97)  BP: 110/50 (01-28-20 @ 13:45) (88/34 - 135/55)  BP(mean): 55 (01-28-20 @ 13:30) (47 - 62)  RR: 13 (01-28-20 @ 13:45) (9 - 18)  SpO2: 100% (01-28-20 @ 13:45) (92% - 100%)    Gen: NAD  Pulm: spont, unlabored  Abd: soft, NT, ND  Ext: WWP, DP 2+    HV (sewn)  serosang out put: 150/250  +Mont[place]    Motor:  RLE: 5/5 IP   5/5 Q    5/5 HS    5/5 TA    5/5 GS    5/5 EHL              LLE: 5/5 IP   5/5 Q    5/5 HS    5/5 TA    5/5 GS    5/5 EHL    Sensation equal and grossly intact                       A/P: 74 y/o M status post uncomplicated L4-L5 Posterior Decompression Instrumented Fusion.  No radicular symptoms.  Neurologically intact.      Pain control/analgesia  Neuro checks  Cont HV drain  WBAT/PT/OT/OOB  LSO brace with ambulation  Cont home meds  PPI, bowel regimen  Inc spirometry  FU Labs  SCDs  Medical co-management appreciated  Dispo plan Patient seen and examined in the PACU.  No current complaints.  Surgical pain is controlled.  Denies radiation of pain or paresthesia to the bilateral lower extremities.  Denies CP, SOB, N/V/HA.         HEALTH ISSUES - PROBLEM Dx:  Sleep apnea  H/O aortic valve replacement  Spinal stenosis, lumbar          MEDICATIONS  (STANDING):  acetaminophen   Tablet .. 650 milliGRAM(s) Oral every 6 hours  amLODIPine   Tablet 5 milliGRAM(s) Oral daily  atorvastatin 10 milliGRAM(s) Oral at bedtime  ceFAZolin   IVPB 2000 milliGRAM(s) IV Intermittent every 8 hours  HYDROmorphone PCA (1 mG/mL) 30 milliLiter(s) PCA Continuous PCA Continuous  lactated ringers. 1000 milliLiter(s) IV Continuous <Continuous>  lisinopril 20 milliGRAM(s) Oral daily  pantoprazole    Tablet 40 milliGRAM(s) Oral before breakfast  polyethylene glycol 3350 17 Gram(s) Oral every 12 hours  senna 2 Tablet(s) Oral at bedtime  tamsulosin 0.4 milliGRAM(s) Oral at bedtime      Allergies    No Known Allergies    Intolerances        PAST MEDICAL & SURGICAL HISTORY:  Obesity  Chronic GERD  Diverticulitis  Smoking history: quit 2012 - 54 yrs  Obesity  HTN (hypertension)  HLD (hyperlipidemia)  Sleep apnea syndrome  Aortic valve defect: aortic valve replacement 2010 (bovine)  Pancreatitis: Hospitalized 2009  Cervical disc disorder  Lumbar disc disease  Cholelithiasis  GERD (gastroesophageal reflux disease)  Bladder cancer: TURBT x3 2019  Aortic valve replaced: Bovine - Waltres Model # [id number]  2012  S/P colon resection: 2000 - Diverticulitis                            14.1   13.97 )-----------( 193      ( 28 Jan 2020 12:30 )             44.1                       Vital Signs Last 24 Hrs  T(C): 36.6 (01-28-20 @ 11:45), Max: 37 (01-28-20 @ 06:03)  T(F): 97.9 (01-28-20 @ 11:45), Max: 98.6 (01-28-20 @ 06:03)  HR: 80 (01-28-20 @ 13:45) (67 - 97)  BP: 110/50 (01-28-20 @ 13:45) (88/34 - 135/55)  BP(mean): 55 (01-28-20 @ 13:30) (47 - 62)  RR: 13 (01-28-20 @ 13:45) (9 - 18)  SpO2: 100% (01-28-20 @ 13:45) (92% - 100%)    Gen: NAD  Pulm: spont, unlabored  Abd: soft, NT, ND  Ext: WWP, DP 2+    HV (sewn)  serosang out put: 150/250  +Mont[place]    Motor:  RLE: 5/5 IP   5/5 Q    5/5 HS    5/5 TA    5/5 GS    5/5 EHL              LLE: 5/5 IP   5/5 Q    5/5 HS    5/5 TA    5/5 GS    5/5 EHL    Sensation equal and grossly intact                       A/P: 74 y/o M status post uncomplicated L4-L5 Posterior Decompression Instrumented Fusion.  No radicular symptoms.  Neurologically intact.      Pain control/analgesia  Neuro checks  Cont HV drain  WBAT/PT/OT/OOB  LSO brace with ambulation  Cont home meds  PPI, bowel regimen  Inc spirometry  FU Labs  SCDs  Medical co-management appreciated  Dispo plan    Patient seen postoperatively doing well. Complete resolution of his preoperative leg pains. His incisional pain was controlled. I discussed and I agree with the above, Dr. Levar Horan.

## 2020-01-28 NOTE — PHYSICAL THERAPY INITIAL EVALUATION ADULT - GENERAL OBSERVATIONS, REHAB EVAL
Pt received supine in stretcher, hemovac, blum catheter, telemetry monitor, left UE IV, NAD. Pt agreeable to PT consultation

## 2020-01-28 NOTE — PATIENT PROFILE ADULT - HAVE YOU EXPERIENCED VIOLENCE OR A TRAUMATIC EVENT?
Baby is a 36+2 wk GA female born to a 35 y/o  mother via primary CS, initially breech, then transverse, failed version in triage. Maternal history uncomplicated. Prenatal history uncomplicated. Maternal blood type O+. Prenatal labs negative, non-reactive, and immune. GBS unknown, treated adequately with ampicillin. PPROM clear fluids at 12:30 on . Baby born initially stunned appearing, but vigorous and crying with stimulation. Warmed, dried, stimulated, suctioned. Apgars 9/9. Mom's highest temp 36.8. EOS 0.08. Mom plans to breastfeed, would like hepB.    Since admission to the NBN, baby has been feeding well, stooling and making wet diapers. Vitals have remained stable. Baby received routine NBN care. The baby lost an acceptable amount of weight during the nursery stay, down __ % from birth weight.  Bilirubin was __ at __ hours of life, which is in the ___ risk zone.     See below for CCHD, auditory screening, and Hepatitis B vaccine status.  Patient is stable for discharge to home after receiving routine  care education and instructions to follow up with pediatrician appointment in 1-2 days. Baby is a 36+2 wk GA female born to a 37 y/o  mother via primary CS, initially breech, then transverse, failed version in triage. Maternal history uncomplicated. Prenatal history uncomplicated. Maternal blood type O+. Prenatal labs negative, non-reactive, and immune. GBS unknown, treated adequately with ampicillin. PPROM clear fluids at 12:30 on . Baby born initially stunned appearing, but vigorous and crying with stimulation. Warmed, dried, stimulated, suctioned. Apgars 9/9. Mom's highest temp 36.8. EOS 0.08. Mom plans to breastfeed, would like hepB.    Since admission to the NBN, baby has been feeding well, stooling and making wet diapers. Vitals have remained stable. Baby received routine NBN care. The baby lost an acceptable amount of weight during the nursery stay, down 4.6% from birth weight.  Bilirubin was 4.7 at 25 hours of life, which is in the low risk zone.     See below for CCHD, auditory screening, and Hepatitis B vaccine status.  Patient is stable for discharge to home after receiving routine  care education and instructions to follow up with pediatrician appointment in 1-2 days. no Baby is a 36+2 wk GA female born to a 35 y/o  mother via primary CS, initially breech, then transverse, failed version in triage. Maternal history uncomplicated. Prenatal history uncomplicated. Maternal blood type O+. Prenatal labs negative, non-reactive, and immune. GBS unknown, treated adequately with ampicillin. PPROM clear fluids at 12:30 on . Baby born initially stunned appearing, but vigorous and crying with stimulation. Warmed, dried, stimulated, suctioned. Apgars 9/9. Mom's highest temp 36.8. EOS 0.08.     Since admission to the NBN, baby has been feeding well, stooling and making wet diapers. Vitals have remained stable. Baby received routine NBN care. The baby lost an acceptable amount of weight during the nursery stay, down 5.8% from birth weight.  Bilirubin was 5.5 at 37 hours of life, which is in the low risk zone.     See below for CCHD, auditory screening, and Hepatitis B vaccine status.  Patient is stable for discharge to home after receiving routine  care education and instructions to follow up with pediatrician appointment in 1-2 days.    Pediatric Attending Addendum:  I have read and agree with above PGY1 Discharge Note except for any changes detailed below.   I have spent time with the patient and the patient's family on direct patient care and discharge planning.   Plan to follow-up per above.  Please see above weight and bilirubin.     Discharge Exam:  GEN: NAD alert active  HEENT:  AFOF, +RR b/l, MMM  CHEST: nml s1/s2, RRR, no murmur, lungs cta b/l  Abd: soft/nt/nd +bs no hsm  umbilical stump c/d/i  Hips: neg Ortolani/Roca  : normal genitalia, visually patent anus  Neuro: +grasp/suck/marcelina  Skin: no abnormal rash    Well 36wk   via ; breech- hip ultrasound in ~ 6 weeks; dsticks monitored due to prematurity and were within normal  limits; vitals monitored per guideline and were also within normal  limits; bilirubin level low risk; Car seat challenge passed; Discharge home with pediatrician follow-up in 1-2 days; Mother educated about jaundice, importance of baby feeding well, monitoring wet diapers and stools and following up with pediatrician; She expressed understanding;      Silke Buchanan MD

## 2020-01-28 NOTE — PHYSICAL THERAPY INITIAL EVALUATION ADULT - ADDITIONAL COMMENTS
Pt lives in private house with wife, 3 steps to enter. bathroom and bedroom on main floor. However, pt occasionally goes to second floor for storage, etc. No prior use of assistive device. Pt participate in acapella group with choreographed routines.    Pt left semi-rivas in bed, +call bell, hemovac, blum catheter, telemetry monitor, left UE IV. NAD.

## 2020-01-28 NOTE — ASU PREOP CHECKLIST - SELECT TESTS ORDERED
Type and Screen/CMP/CBC/PT/PTT/INR/EKG/Type and Cross EKG/INR/nasal swab - negative/CMP/Type and Cross/Type and Screen/PT/PTT/CBC

## 2020-01-28 NOTE — PHYSICAL THERAPY INITIAL EVALUATION ADULT - PERTINENT HX OF CURRENT PROBLEM, REHAB EVAL
75 y.o. Male status post uncomplicated L4-L5 Posterior Decompression Instrumented Fusion.  No radicular symptoms.  Neurologically intact.

## 2020-01-29 DIAGNOSIS — Z98.1 ARTHRODESIS STATUS: ICD-10-CM

## 2020-01-29 DIAGNOSIS — D72.829 ELEVATED WHITE BLOOD CELL COUNT, UNSPECIFIED: ICD-10-CM

## 2020-01-29 DIAGNOSIS — K21.9 GASTRO-ESOPHAGEAL REFLUX DISEASE WITHOUT ESOPHAGITIS: ICD-10-CM

## 2020-01-29 DIAGNOSIS — I10 ESSENTIAL (PRIMARY) HYPERTENSION: ICD-10-CM

## 2020-01-29 DIAGNOSIS — M51.9 UNSPECIFIED THORACIC, THORACOLUMBAR AND LUMBOSACRAL INTERVERTEBRAL DISC DISORDER: ICD-10-CM

## 2020-01-29 DIAGNOSIS — D50.0 IRON DEFICIENCY ANEMIA SECONDARY TO BLOOD LOSS (CHRONIC): ICD-10-CM

## 2020-01-29 DIAGNOSIS — N17.9 ACUTE KIDNEY FAILURE, UNSPECIFIED: ICD-10-CM

## 2020-01-29 LAB
ANION GAP SERPL CALC-SCNC: 10 MMO/L — SIGNIFICANT CHANGE UP (ref 7–14)
BASOPHILS # BLD AUTO: 0.01 K/UL — SIGNIFICANT CHANGE UP (ref 0–0.2)
BASOPHILS NFR BLD AUTO: 0.1 % — SIGNIFICANT CHANGE UP (ref 0–2)
BUN SERPL-MCNC: 28 MG/DL — HIGH (ref 7–23)
CALCIUM SERPL-MCNC: 9 MG/DL — SIGNIFICANT CHANGE UP (ref 8.4–10.5)
CHLORIDE SERPL-SCNC: 100 MMOL/L — SIGNIFICANT CHANGE UP (ref 98–107)
CO2 SERPL-SCNC: 24 MMOL/L — SIGNIFICANT CHANGE UP (ref 22–31)
CREAT SERPL-MCNC: 1.4 MG/DL — HIGH (ref 0.5–1.3)
EOSINOPHIL # BLD AUTO: 0 K/UL — SIGNIFICANT CHANGE UP (ref 0–0.5)
EOSINOPHIL NFR BLD AUTO: 0 % — SIGNIFICANT CHANGE UP (ref 0–6)
GLUCOSE SERPL-MCNC: 120 MG/DL — HIGH (ref 70–99)
HCT VFR BLD CALC: 32.7 % — LOW (ref 39–50)
HGB BLD-MCNC: 10.5 G/DL — LOW (ref 13–17)
IMM GRANULOCYTES NFR BLD AUTO: 0.4 % — SIGNIFICANT CHANGE UP (ref 0–1.5)
LYMPHOCYTES # BLD AUTO: 0.92 K/UL — LOW (ref 1–3.3)
LYMPHOCYTES # BLD AUTO: 7.2 % — LOW (ref 13–44)
MCHC RBC-ENTMCNC: 29.7 PG — SIGNIFICANT CHANGE UP (ref 27–34)
MCHC RBC-ENTMCNC: 32.1 % — SIGNIFICANT CHANGE UP (ref 32–36)
MCV RBC AUTO: 92.4 FL — SIGNIFICANT CHANGE UP (ref 80–100)
MONOCYTES # BLD AUTO: 0.69 K/UL — SIGNIFICANT CHANGE UP (ref 0–0.9)
MONOCYTES NFR BLD AUTO: 5.4 % — SIGNIFICANT CHANGE UP (ref 2–14)
NEUTROPHILS # BLD AUTO: 11.1 K/UL — HIGH (ref 1.8–7.4)
NEUTROPHILS NFR BLD AUTO: 86.9 % — HIGH (ref 43–77)
NRBC # FLD: 0 K/UL — SIGNIFICANT CHANGE UP (ref 0–0)
PLATELET # BLD AUTO: 132 K/UL — LOW (ref 150–400)
PMV BLD: 13 FL — SIGNIFICANT CHANGE UP (ref 7–13)
POTASSIUM SERPL-MCNC: 4.5 MMOL/L — SIGNIFICANT CHANGE UP (ref 3.5–5.3)
POTASSIUM SERPL-SCNC: 4.5 MMOL/L — SIGNIFICANT CHANGE UP (ref 3.5–5.3)
RBC # BLD: 3.54 M/UL — LOW (ref 4.2–5.8)
RBC # FLD: 13.8 % — SIGNIFICANT CHANGE UP (ref 10.3–14.5)
SODIUM SERPL-SCNC: 134 MMOL/L — LOW (ref 135–145)
WBC # BLD: 12.77 K/UL — HIGH (ref 3.8–10.5)
WBC # FLD AUTO: 12.77 K/UL — HIGH (ref 3.8–10.5)

## 2020-01-29 PROCEDURE — 99223 1ST HOSP IP/OBS HIGH 75: CPT

## 2020-01-29 RX ORDER — HYDROMORPHONE HYDROCHLORIDE 2 MG/ML
0.5 INJECTION INTRAMUSCULAR; INTRAVENOUS; SUBCUTANEOUS EVERY 4 HOURS
Refills: 0 | Status: DISCONTINUED | OUTPATIENT
Start: 2020-01-29 | End: 2020-01-30

## 2020-01-29 RX ORDER — DIAZEPAM 5 MG
2 TABLET ORAL EVERY 8 HOURS
Refills: 0 | Status: DISCONTINUED | OUTPATIENT
Start: 2020-01-29 | End: 2020-01-30

## 2020-01-29 RX ORDER — BENZOCAINE AND MENTHOL 5; 1 G/100ML; G/100ML
1 LIQUID ORAL
Refills: 0 | Status: DISCONTINUED | OUTPATIENT
Start: 2020-01-29 | End: 2020-01-30

## 2020-01-29 RX ORDER — OXYCODONE HYDROCHLORIDE 5 MG/1
5 TABLET ORAL EVERY 4 HOURS
Refills: 0 | Status: DISCONTINUED | OUTPATIENT
Start: 2020-01-29 | End: 2020-01-29

## 2020-01-29 RX ORDER — OXYCODONE HYDROCHLORIDE 5 MG/1
5 TABLET ORAL EVERY 4 HOURS
Refills: 0 | Status: DISCONTINUED | OUTPATIENT
Start: 2020-01-29 | End: 2020-01-30

## 2020-01-29 RX ORDER — SODIUM CHLORIDE 9 MG/ML
1000 INJECTION, SOLUTION INTRAVENOUS
Refills: 0 | Status: DISCONTINUED | OUTPATIENT
Start: 2020-01-29 | End: 2020-01-30

## 2020-01-29 RX ORDER — OXYCODONE HYDROCHLORIDE 5 MG/1
10 TABLET ORAL EVERY 4 HOURS
Refills: 0 | Status: DISCONTINUED | OUTPATIENT
Start: 2020-01-29 | End: 2020-01-30

## 2020-01-29 RX ADMIN — PANTOPRAZOLE SODIUM 40 MILLIGRAM(S): 20 TABLET, DELAYED RELEASE ORAL at 05:44

## 2020-01-29 RX ADMIN — Medication 650 MILLIGRAM(S): at 05:44

## 2020-01-29 RX ADMIN — HYDROMORPHONE HYDROCHLORIDE 30 MILLILITER(S): 2 INJECTION INTRAMUSCULAR; INTRAVENOUS; SUBCUTANEOUS at 08:20

## 2020-01-29 RX ADMIN — Medication 81 MILLIGRAM(S): at 13:52

## 2020-01-29 RX ADMIN — OXYCODONE HYDROCHLORIDE 10 MILLIGRAM(S): 5 TABLET ORAL at 22:14

## 2020-01-29 RX ADMIN — AMLODIPINE BESYLATE 5 MILLIGRAM(S): 2.5 TABLET ORAL at 05:44

## 2020-01-29 RX ADMIN — POLYETHYLENE GLYCOL 3350 17 GRAM(S): 17 POWDER, FOR SOLUTION ORAL at 18:11

## 2020-01-29 RX ADMIN — Medication 650 MILLIGRAM(S): at 14:51

## 2020-01-29 RX ADMIN — POLYETHYLENE GLYCOL 3350 17 GRAM(S): 17 POWDER, FOR SOLUTION ORAL at 05:44

## 2020-01-29 RX ADMIN — ATORVASTATIN CALCIUM 10 MILLIGRAM(S): 80 TABLET, FILM COATED ORAL at 22:04

## 2020-01-29 RX ADMIN — OXYCODONE HYDROCHLORIDE 10 MILLIGRAM(S): 5 TABLET ORAL at 23:00

## 2020-01-29 RX ADMIN — BENZOCAINE AND MENTHOL 1 LOZENGE: 5; 1 LIQUID ORAL at 05:44

## 2020-01-29 RX ADMIN — Medication 650 MILLIGRAM(S): at 18:11

## 2020-01-29 RX ADMIN — Medication 650 MILLIGRAM(S): at 18:12

## 2020-01-29 RX ADMIN — LISINOPRIL 20 MILLIGRAM(S): 2.5 TABLET ORAL at 05:44

## 2020-01-29 RX ADMIN — SENNA PLUS 2 TABLET(S): 8.6 TABLET ORAL at 22:04

## 2020-01-29 RX ADMIN — Medication 650 MILLIGRAM(S): at 13:53

## 2020-01-29 RX ADMIN — Medication 650 MILLIGRAM(S): at 23:44

## 2020-01-29 RX ADMIN — TAMSULOSIN HYDROCHLORIDE 0.4 MILLIGRAM(S): 0.4 CAPSULE ORAL at 22:04

## 2020-01-29 RX ADMIN — SODIUM CHLORIDE 75 MILLILITER(S): 9 INJECTION, SOLUTION INTRAVENOUS at 11:10

## 2020-01-29 NOTE — PROGRESS NOTE ADULT - SUBJECTIVE AND OBJECTIVE BOX
Anesthesia Pain Management Service    SUBJECTIVE: Patient is doing well with IV PCA and no significant problems reported.    Pain Scale Score	At rest: _4/10__ 	With Activity: ___ 	[X ] Refer to charted pain scores    THERAPY:    [ ] IV PCA Morphine		[ ] 5 mg/mL	[ ] 1 mg/mL  [X ] IV PCA Hydromorphone	[ ] 5 mg/mL	[X ] 1 mg/mL  [ ] IV PCA Fentanyl		[ ] 50 micrograms/mL    Demand dose __0.2_ lockout __6_ (minutes) Continuous Rate _0__ Total: _1.4__   mg used (in past 24 hrs)      MEDICATIONS  (STANDING):  acetaminophen   Tablet .. 650 milliGRAM(s) Oral every 6 hours  amLODIPine   Tablet 5 milliGRAM(s) Oral daily  aspirin enteric coated 81 milliGRAM(s) Oral daily  atorvastatin 10 milliGRAM(s) Oral at bedtime  lactated ringers. 1000 milliLiter(s) (75 mL/Hr) IV Continuous <Continuous>  oxyCODONE    IR 5 milliGRAM(s) Oral every 4 hours  pantoprazole    Tablet 40 milliGRAM(s) Oral before breakfast  polyethylene glycol 3350 17 Gram(s) Oral every 12 hours  senna 2 Tablet(s) Oral at bedtime  tamsulosin 0.4 milliGRAM(s) Oral at bedtime    MEDICATIONS  (PRN):  benzocaine 15 mG/menthol 3.6 mG (Sugar-Free) Lozenge 1 Lozenge Oral every 3 hours PRN Sore Throat  diazepam    Tablet 2 milliGRAM(s) Oral every 8 hours PRN spasm  HYDROmorphone  Injectable 0.5 milliGRAM(s) IV Push every 4 hours PRN breakthrough pain  naloxone Injectable 0.1 milliGRAM(s) IV Push every 3 minutes PRN For ANY of the following changes in patient status:  A. RR LESS THAN 10 breaths per minute, B. Oxygen saturation LESS THAN 90%, C. Sedation score of 6  ondansetron Injectable 4 milliGRAM(s) IV Push every 6 hours PRN Nausea  ondansetron Injectable 4 milliGRAM(s) IV Push every 6 hours PRN Nausea  oxyCODONE    IR 10 milliGRAM(s) Oral every 4 hours PRN Severe Pain (7 - 10)      OBJECTIVE:  Patient is sitting up in bed.    Sedation Score:	[ X] Alert	[ ] Drowsy 	[ ] Arousable	[ ] Asleep	[ ] Unresponsive    Side Effects:	[X ] None	[ ] Nausea	[ ] Vomiting	[ ] Pruritus  		[ ] Other:    Vital Signs Last 24 Hrs  T(C): 36.9 (29 Jan 2020 09:15), Max: 36.9 (29 Jan 2020 09:15)  T(F): 98.5 (29 Jan 2020 09:15), Max: 98.5 (29 Jan 2020 09:15)  HR: 68 (29 Jan 2020 09:15) (57 - 97)  BP: 138/45 (29 Jan 2020 09:15) (88/34 - 138/45)  BP(mean): 66 (28 Jan 2020 15:45) (47 - 66)  RR: 16 (29 Jan 2020 09:15) (9 - 19)  SpO2: 94% (29 Jan 2020 09:15) (92% - 100%)    ASSESSMENT/ PLAN    Therapy to  be:	[ ] Continue   [ X] Discontinued   [X ] Change to prn Analgesics    Documentation and Verification of current medications:   [X] Done	[ ] Not done, not elligible    Comments: Team discontinued IV PCA and ordered PRN Oral/IV opioids and/or Adjuvant non-opioid medications.         Progress Note written now but Patient was seen earlier.

## 2020-01-29 NOTE — CONSULT NOTE ADULT - SUBJECTIVE AND OBJECTIVE BOX
[facility] Division of [facility] Medicine  Marcos Ortiz MD  Pager (HEATHER-UNA, 8O-3R): [phone]  Other Times:  [phone]    Patient is a 75y old  Male who presents with a chief complaint of s/p L4-L5 Posterior Decompression Instrumented Fusion (28 Jan 2020 14:09)      HPI:  75 y/o male with PMH bladder cancer, s/p TURBT x3, in 2019, and course of chemo which completed 7/2019.  Pt with hx of lower back pain since 1975, worsened in past year. Dx with lumbar spinal stenosis, and spondylolisthesis.  Scheduled for L3-L4, L4-L5 Lumbar Laminectomy with fusion 1/28/20. (14 Jan 2020 11:04)  Patient underwent L4-5 lami/fusion on 1/28 complicated by post-op leukocytosis, anemia and MELITA.  Pain is rated 7/10 in severity, spasm in nature, localized to surgical site, worse with movement, improved with pain meds.    No F/C, N/V, CP, SOB, Cough, lightheadedness, dizziness, abdominal pain, diarrhea, dysuria.    Pain Symptoms if applicable:             	                         none	   mild         moderate         severe  Pain:	            7                0	    1-3	     4-6	         7-10  Location:	Surgical site  Modifying factors:	Worse with movement  Associated symptoms:	    Allergies    No Known Allergies    Intolerances        HOME MEDICATIONS: Reviewed    MEDICATIONS  (STANDING):  acetaminophen   Tablet .. 650 milliGRAM(s) Oral every 6 hours  amLODIPine   Tablet 5 milliGRAM(s) Oral daily  aspirin enteric coated 81 milliGRAM(s) Oral daily  atorvastatin 10 milliGRAM(s) Oral at bedtime  lactated ringers. 1000 milliLiter(s) (75 mL/Hr) IV Continuous <Continuous>  pantoprazole    Tablet 40 milliGRAM(s) Oral before breakfast  polyethylene glycol 3350 17 Gram(s) Oral every 12 hours  senna 2 Tablet(s) Oral at bedtime  tamsulosin 0.4 milliGRAM(s) Oral at bedtime    MEDICATIONS  (PRN):  benzocaine 15 mG/menthol 3.6 mG (Sugar-Free) Lozenge 1 Lozenge Oral every 3 hours PRN Sore Throat  diazepam    Tablet 2 milliGRAM(s) Oral every 8 hours PRN spasm  HYDROmorphone  Injectable 0.5 milliGRAM(s) IV Push every 4 hours PRN breakthrough pain  naloxone Injectable 0.1 milliGRAM(s) IV Push every 3 minutes PRN For ANY of the following changes in patient status:  A. RR LESS THAN 10 breaths per minute, B. Oxygen saturation LESS THAN 90%, C. Sedation score of 6  ondansetron Injectable 4 milliGRAM(s) IV Push every 6 hours PRN Nausea  ondansetron Injectable 4 milliGRAM(s) IV Push every 6 hours PRN Nausea  oxyCODONE    IR 10 milliGRAM(s) Oral every 4 hours PRN Severe Pain (7 - 10)  oxyCODONE    IR 5 milliGRAM(s) Oral every 4 hours PRN Moderate Pain (4 - 6)      PAST MEDICAL & SURGICAL HISTORY:  Obesity  Chronic GERD  Diverticulitis  Smoking history: quit 2012 - 55 yrs  Obesity  HTN (hypertension)  HLD (hyperlipidemia)  Sleep apnea syndrome  Aortic valve defect: aortic valve replacement 2010 (bovine)  Pancreatitis: Hospitalized 2009  Cervical disc disorder  Lumbar disc disease  Cholelithiasis  GERD (gastroesophageal reflux disease)  Bladder cancer: TURBT x3 2019  Aortic valve replaced: Bovine - [employer]ters Model # [id number]  2012  S/P colon resection: 2000 - Diverticulitis      SOCIAL HISTORY:  No tobacco/alcohol use/abuse.    FAMILY HISTORY:  FH: stroke: mother had stroke, cerebral hemorrhage.  brother had cerebral hemorrhage      REVIEW OF SYSTEMS:    CONSTITUTIONAL: No fever, weight loss, or fatigue  EYES: No eye pain, visual disturbances, or discharge  ENMT:  No difficulty hearing, tinnitus, vertigo; No sinus or throat pain  NECK: No pain or stiffness  BREASTS: No pain, masses, or nipple discharge  RESPIRATORY: No cough, wheezing, chills or hemoptysis; No shortness of breath  CARDIOVASCULAR: No chest pain, palpitations, dizziness, or leg swelling  GASTROINTESTINAL: No abdominal or epigastric pain. No nausea, vomiting, or hematemesis; No diarrhea or constipation. No melena or hematochezia.  GENITOURINARY: No dysuria, frequency, hematuria, or incontinence  NEUROLOGICAL: No headaches, memory loss, loss of strength, numbness, or tremors  SKIN: No itching, burning, rashes, or lesions   LYMPH NODES: No enlarged glands  ENDOCRINE: No heat or cold intolerance; No hair loss  MUSCULOSKELETAL: Back pain present.  PSYCHIATRIC: No depression, anxiety, mood swings, or difficulty sleeping  HEME/LYMPH: No easy bruising, or bleeding gums  ALLERGY AND IMMUNOLOGIC: No hives or eczema    Vital Signs Last 24 Hrs  T(C): 36.9 (29 Jan 2020 09:15), Max: 36.9 (29 Jan 2020 09:15)  T(F): 98.5 (29 Jan 2020 09:15), Max: 98.5 (29 Jan 2020 09:15)  HR: 68 (29 Jan 2020 09:15) (57 - 90)  BP: 138/45 (29 Jan 2020 09:15) (99/47 - 138/45)  BP(mean): 66 (28 Jan 2020 15:45) (55 - 66)  RR: 16 (29 Jan 2020 09:15) (11 - 19)  SpO2: 94% (29 Jan 2020 09:15) (92% - 100%)  CAPILLARY BLOOD GLUCOSE          PHYSICAL EXAM:    GENERAL: NAD, well-groomed  HEAD:  Atraumatic, Normocephalic  EYES: EOMI, PERRLA, conjunctiva and sclera clear  ENMT: Moist mucous membranes  NECK: Supple, No JVD  RESPIRATORY: Clear to auscultation bilaterally; No rales, rhonchi, wheezing, or rubs  CARDIOVASCULAR: Regular rate and rhythm; No murmurs, rubs, or gallops  GASTROINTESTINAL: Soft, Nontender, Nondistended; Bowel sounds present. [place] in place.  BACK: Limited ROM due to pain.  GENITOURINARY: Not examined  EXTREMITIES:  2+ Peripheral Pulses, No clubbing, cyanosis, or edema  NERVOUS SYSTEM:  Alert & Oriented X3; Moving all 4 extremities; No gross sensory deficits  PSYCH: Calm  HEME/LYMPH: No lymphadenopathy noted  SKIN: No rashes or lesions; Incisions C/D/I    LABS:                        10.5   12.77 )-----------( 132      ( 29 Jan 2020 05:00 )             32.7     01-29    134<L>  |  100  |  28<H>  ----------------------------<  120<H>  4.5   |  24  |  1.40<H>    Ca    9.0      29 Jan 2020 05:00          CAPILLARY BLOOD GLUCOSE          RADIOLOGY & ADDITIONAL STUDIES:    Imaging:   Personally Reviewed:  [ ] YES               EKG:   Personally Reviewed:  [ ] YES       Care Discussed with Consultant(s)/Other Providers:  Care Discussed with Primary Team.      [] Increased delirium risk  [] Delirium and other risks can be reduced by:          -early ambulation          -minimizing "tethers" - IV, oxygen, catheters, etc          -avoiding hypnotics and sedatives          -maintaining hydration/nutrition          -avoid anticholinergics - diphenhydramine, etc          -pain control          -supportive environment

## 2020-01-29 NOTE — PROGRESS NOTE ADULT - SUBJECTIVE AND OBJECTIVE BOX
No acute events overnight. Pain well controlled with pain medications. Patient walked well with physical therapy.    Vital Signs Last 24 Hrs  T(C): 36.5 (29 Jan 2020 05:43), Max: 36.8 (28 Jan 2020 20:48)  T(F): 97.7 (29 Jan 2020 05:43), Max: 98.3 (28 Jan 2020 20:48)  HR: 77 (29 Jan 2020 05:43) (57 - 97)  BP: 124/45 (29 Jan 2020 05:43) (88/34 - 132/49)  BP(mean): 66 (28 Jan 2020 15:45) (47 - 66)  RR: 16 (29 Jan 2020 05:43) (9 - 19)  SpO2: 94% (29 Jan 2020 05:43) (92% - 100%)                          14.1   13.97 )-----------( 193      ( 28 Jan 2020 12:30 )             44.1     01-28    136  |  102  |  27<H>  ----------------------------<  166<H>  4.6   |  20<L>  |  1.80<H>    Ca    8.3<L>      28 Jan 2020 12:30    Exam:  Gen: NAD  BL LE: 5/5 TA/GS/EHL/Q/IP, SILT  HV output sanguinous, 120/680cc  Dressing: Clean, Dry, Intact    A/P: 75 year old M s/p L4-5 laminectomy/PSIF POD#1    - Pain Control, PCA  - Regular Diet  - Venodynes  - FU labs  - Monitor drain output  - TG blum today  - PT/OT, OOB  - Discharge Planning No acute events overnight. Pain well controlled with pain medications. Patient walked well with physical therapy.    Vital Signs Last 24 Hrs  T(C): 36.5 (29 Jan 2020 05:43), Max: 36.8 (28 Jan 2020 20:48)  T(F): 97.7 (29 Jan 2020 05:43), Max: 98.3 (28 Jan 2020 20:48)  HR: 77 (29 Jan 2020 05:43) (57 - 97)  BP: 124/45 (29 Jan 2020 05:43) (88/34 - 132/49)  BP(mean): 66 (28 Jan 2020 15:45) (47 - 66)  RR: 16 (29 Jan 2020 05:43) (9 - 19)  SpO2: 94% (29 Jan 2020 05:43) (92% - 100%)                          14.1   13.97 )-----------( 193      ( 28 Jan 2020 12:30 )             44.1     01-28    136  |  102  |  27<H>  ----------------------------<  166<H>  4.6   |  20<L>  |  1.80<H>    Ca    8.3<L>      28 Jan 2020 12:30    Exam:  Gen: NAD  BL LE: 5/5 TA/GS/EHL/Q/IP, SHIREEN  HV output sanguinous, 120/680cc  Dressing: Clean, Dry, Intact    A/P: 75 year old M s/p L4-5 laminectomy/PSIF POD#1    - Pain Control, PCA  - Regular Diet  - Venodynes  - FU labs  - Monitor drain output  - TG blum today  - PT/OT, OOB  - Discharge Planning    I discussed and agree with the above, Dr. Levar Horan.

## 2020-01-29 NOTE — CONSULT NOTE ADULT - PROBLEM SELECTOR RECOMMENDATION 2
Pain control with dilaudid PCA, s/p decadron x1.  IPC for VTE prophylaxis.  Wound care as per ortho  PT/OT eval for safe disposition.

## 2020-01-29 NOTE — CONSULT NOTE ADULT - PROBLEM SELECTOR RECOMMENDATION 3
Suspect hemodynamic associated.  Improving with IVF hydration.  Monitor BMP.  Stopped Lisinopril.  Avoid nephrotoxic agents.

## 2020-01-29 NOTE — PROGRESS NOTE ADULT - SUBJECTIVE AND OBJECTIVE BOX
ANESTHESIA POSTOP CHECK    75y Male POSTOP DAY 1 S/P open lumbar laminectomy. Patient doing well. pain well controlled, Pt denies any nausea, vomiting, is alert/oriented, and has no additional complaints.     Vital Signs Last 24 Hrs  T(C): 36.8 (29 Jan 2020 13:01), Max: 36.9 (29 Jan 2020 09:15)  T(F): 98.2 (29 Jan 2020 13:01), Max: 98.5 (29 Jan 2020 09:15)  HR: 84 (29 Jan 2020 13:01) (57 - 90)  BP: 140/45 (29 Jan 2020 13:01) (100/60 - 140/45)  BP(mean): 66 (28 Jan 2020 15:45) (60 - 66)  RR: 16 (29 Jan 2020 13:01) (11 - 19)  SpO2: 97% (29 Jan 2020 13:01) (92% - 100%)  I&O's Summary    28 Jan 2020 07:01  -  29 Jan 2020 07:00  --------------------------------------------------------  IN: 1205 mL / OUT: 3305 mL / NET: -2100 mL    29 Jan 2020 07:01  -  29 Jan 2020 14:14  --------------------------------------------------------  IN: 0 mL / OUT: 970 mL / NET: -970 mL        [X ] NO APPARENT ANESTHESIA COMPLICATIONS      Comments:

## 2020-01-29 NOTE — PROGRESS NOTE ADULT - SUBJECTIVE AND OBJECTIVE BOX
Anesthesia Pain Management Service    SUBJECTIVE:    Therapy:	  [ x] IV PCA	   [ ] Epidural           [ ] s/p Spinal Opoid              [ ] Postpartum infusion	  [ ] Patient controlled regional anesthesia (PCRA)    [ ] prn Analgesics    OBJECTIVE:   [x ] No new signs     [ ] Other:    Side Effects:  [ x] None			[ ] Other:    Assessment of Catheter Site:		[ ] Intact		[ ] Other:    ASSESSMENT/PLAN  [ ] Continue current therapy    [x ] Therapy changed to:    [ ] IV PCA       [ ] Epidural     [x ] prn Analgesics     Comments:

## 2020-01-29 NOTE — CONSULT NOTE ADULT - ASSESSMENT
74M HTN, GERD, LYNN, Bladder CA s/p TURBT/Chemo, lumbar stenosis s/p L4-5 lami/fusion on 1/28 complicated by post-op leukocytosis, anemia, and MELITA.

## 2020-01-30 ENCOUNTER — TRANSCRIPTION ENCOUNTER (OUTPATIENT)
Age: 75
End: 2020-01-30

## 2020-01-30 VITALS
OXYGEN SATURATION: 95 % | RESPIRATION RATE: 16 BRPM | SYSTOLIC BLOOD PRESSURE: 149 MMHG | HEART RATE: 76 BPM | TEMPERATURE: 98 F | DIASTOLIC BLOOD PRESSURE: 54 MMHG

## 2020-01-30 LAB
ANION GAP SERPL CALC-SCNC: 10 MMO/L — SIGNIFICANT CHANGE UP (ref 7–14)
BASOPHILS # BLD AUTO: 0.02 K/UL — SIGNIFICANT CHANGE UP (ref 0–0.2)
BASOPHILS NFR BLD AUTO: 0.2 % — SIGNIFICANT CHANGE UP (ref 0–2)
BUN SERPL-MCNC: 26 MG/DL — HIGH (ref 7–23)
CALCIUM SERPL-MCNC: 9.5 MG/DL — SIGNIFICANT CHANGE UP (ref 8.4–10.5)
CHLORIDE SERPL-SCNC: 101 MMOL/L — SIGNIFICANT CHANGE UP (ref 98–107)
CO2 SERPL-SCNC: 27 MMOL/L — SIGNIFICANT CHANGE UP (ref 22–31)
CREAT SERPL-MCNC: 1.26 MG/DL — SIGNIFICANT CHANGE UP (ref 0.5–1.3)
EOSINOPHIL # BLD AUTO: 0.02 K/UL — SIGNIFICANT CHANGE UP (ref 0–0.5)
EOSINOPHIL NFR BLD AUTO: 0.2 % — SIGNIFICANT CHANGE UP (ref 0–6)
GLUCOSE SERPL-MCNC: 92 MG/DL — SIGNIFICANT CHANGE UP (ref 70–99)
HCT VFR BLD CALC: 34.4 % — LOW (ref 39–50)
HGB BLD-MCNC: 11 G/DL — LOW (ref 13–17)
IMM GRANULOCYTES NFR BLD AUTO: 0.5 % — SIGNIFICANT CHANGE UP (ref 0–1.5)
LYMPHOCYTES # BLD AUTO: 1.69 K/UL — SIGNIFICANT CHANGE UP (ref 1–3.3)
LYMPHOCYTES # BLD AUTO: 19.9 % — SIGNIFICANT CHANGE UP (ref 13–44)
MCHC RBC-ENTMCNC: 29.9 PG — SIGNIFICANT CHANGE UP (ref 27–34)
MCHC RBC-ENTMCNC: 32 % — SIGNIFICANT CHANGE UP (ref 32–36)
MCV RBC AUTO: 93.5 FL — SIGNIFICANT CHANGE UP (ref 80–100)
MONOCYTES # BLD AUTO: 0.67 K/UL — SIGNIFICANT CHANGE UP (ref 0–0.9)
MONOCYTES NFR BLD AUTO: 7.9 % — SIGNIFICANT CHANGE UP (ref 2–14)
NEUTROPHILS # BLD AUTO: 6.04 K/UL — SIGNIFICANT CHANGE UP (ref 1.8–7.4)
NEUTROPHILS NFR BLD AUTO: 71.3 % — SIGNIFICANT CHANGE UP (ref 43–77)
NRBC # FLD: 0 K/UL — SIGNIFICANT CHANGE UP (ref 0–0)
PLATELET # BLD AUTO: 111 K/UL — LOW (ref 150–400)
PMV BLD: 12.8 FL — SIGNIFICANT CHANGE UP (ref 7–13)
POTASSIUM SERPL-MCNC: 4.3 MMOL/L — SIGNIFICANT CHANGE UP (ref 3.5–5.3)
POTASSIUM SERPL-SCNC: 4.3 MMOL/L — SIGNIFICANT CHANGE UP (ref 3.5–5.3)
RBC # BLD: 3.68 M/UL — LOW (ref 4.2–5.8)
RBC # FLD: 14 % — SIGNIFICANT CHANGE UP (ref 10.3–14.5)
SODIUM SERPL-SCNC: 138 MMOL/L — SIGNIFICANT CHANGE UP (ref 135–145)
SURGICAL PATHOLOGY STUDY: SIGNIFICANT CHANGE UP
WBC # BLD: 8.48 K/UL — SIGNIFICANT CHANGE UP (ref 3.8–10.5)
WBC # FLD AUTO: 8.48 K/UL — SIGNIFICANT CHANGE UP (ref 3.8–10.5)

## 2020-01-30 PROCEDURE — 99233 SBSQ HOSP IP/OBS HIGH 50: CPT

## 2020-01-30 RX ORDER — OXYCODONE HYDROCHLORIDE 5 MG/1
1 TABLET ORAL
Qty: 60 | Refills: 0
Start: 2020-01-30 | End: 2020-02-08

## 2020-01-30 RX ORDER — DIAZEPAM 5 MG
1 TABLET ORAL
Qty: 21 | Refills: 0
Start: 2020-01-30 | End: 2020-02-05

## 2020-01-30 RX ORDER — LISINOPRIL 2.5 MG/1
20 TABLET ORAL DAILY
Refills: 0 | Status: DISCONTINUED | OUTPATIENT
Start: 2020-01-30 | End: 2020-01-30

## 2020-01-30 RX ORDER — ACETAMINOPHEN 500 MG
2 TABLET ORAL
Qty: 0 | Refills: 0 | DISCHARGE
Start: 2020-01-30

## 2020-01-30 RX ADMIN — OXYCODONE HYDROCHLORIDE 10 MILLIGRAM(S): 5 TABLET ORAL at 07:15

## 2020-01-30 RX ADMIN — OXYCODONE HYDROCHLORIDE 10 MILLIGRAM(S): 5 TABLET ORAL at 10:36

## 2020-01-30 RX ADMIN — OXYCODONE HYDROCHLORIDE 10 MILLIGRAM(S): 5 TABLET ORAL at 06:22

## 2020-01-30 RX ADMIN — OXYCODONE HYDROCHLORIDE 10 MILLIGRAM(S): 5 TABLET ORAL at 11:22

## 2020-01-30 RX ADMIN — Medication 650 MILLIGRAM(S): at 06:09

## 2020-01-30 RX ADMIN — POLYETHYLENE GLYCOL 3350 17 GRAM(S): 17 POWDER, FOR SOLUTION ORAL at 06:08

## 2020-01-30 RX ADMIN — PANTOPRAZOLE SODIUM 40 MILLIGRAM(S): 20 TABLET, DELAYED RELEASE ORAL at 06:09

## 2020-01-30 RX ADMIN — Medication 81 MILLIGRAM(S): at 12:30

## 2020-01-30 RX ADMIN — AMLODIPINE BESYLATE 5 MILLIGRAM(S): 2.5 TABLET ORAL at 06:10

## 2020-01-30 RX ADMIN — Medication 650 MILLIGRAM(S): at 12:30

## 2020-01-30 RX ADMIN — BENZOCAINE AND MENTHOL 1 LOZENGE: 5; 1 LIQUID ORAL at 06:30

## 2020-01-30 RX ADMIN — Medication 650 MILLIGRAM(S): at 13:22

## 2020-01-30 NOTE — DISCHARGE NOTE PROVIDER - NSDCMRMEDTOKEN_GEN_ALL_CORE_FT
acetaminophen 325 mg oral tablet: 2 tab(s) orally every 6 hours  amLODIPine 5 mg oral tablet: 1 tab(s) orally once a day  aspirin 81 mg oral tablet: 1 tab(s) orally once a day  diazePAM 2 mg oral tablet: 1 tab(s) orally every 8 hours, As needed for spasm MDD:3  indapamide 1.25 mg oral tablet: 1 tab(s) orally once a day (in the morning)  lisinopril 20 mg oral tablet: 1 tab(s) orally once a day  LSO Brace  Dx: M48.02:   MiraLax oral powder for reconstitution: 1  scoop orally once a day  omeprazole 40 mg oral delayed release capsule: 1 cap(s) orally once a day  oxyCODONE 10 mg oral tablet: 1 tab(s) orally every 4 hours, As needed for pain MDD:5  pravastatin 20 mg oral tablet: 1 tab(s) orally once a day  tamsulosin 0.4 mg oral capsule: 1 cap(s) orally once a day  Vitamin D3 2000 intl units (50 mcg) oral tablet: 1 tab(s) orally once a day

## 2020-01-30 NOTE — DISCHARGE NOTE NURSING/CASE MANAGEMENT/SOCIAL WORK - NSDCPEWEB_GEN_ALL_CORE
[facility] for Tobacco Control website --- [email or web address] website --- [email or web address]

## 2020-01-30 NOTE — DISCHARGE NOTE PROVIDER - NSDCFUADDINST_GEN_ALL_CORE_FT
The patient is a 74 y/o M PMH significant for Bladder CA s/p TURBT x 3 s/p chemo, HTN, GERD, LYNN, lumbar spondylolisthesis status post uncomplicated L4-L5 Posterior Decompression Instrumented Fusion 1/28/2020.   The patient tolerated the procedure well and was transferred to the floor in stable condition.  Remainder of hospital course eventful for MELITA (creatinine 1.8) which was treated and resolved with holding Lisinopril and IVF.  Surgical pain is controlled with current PO regimen.  Patient reports complete resolution of lower extremity pain and paresthesia.  Functional goals have been met. Rolling walker provided for gait stabilization.     Patient is neurologically intact; motor strength and sensation.  Medically cleared for discharge to home at this time.    HV discontinued at the bedside without any issues.  Incision site is well approximated, clean/dry/intact.  No erythema/swelling/drainage.     Keep incision site clean/dry/intact at all times.  Change dressing as needed.  Materials provided.     Restrictions for at least 8 weeks; no strenuous activity.  No lift/push/pull/carry objects greater than 10lbs. No repetitive bending/lifting/twisting/squatting.  No prolonged sitting/standing/walking greater than 30 minutes.  Advance activity as tolerated.    LSO brace with ambulation and during transport. The patient is a 74 y/o M PMH significant for Bladder CA s/p TURBT x 3 s/p chemo, HTN, HLD, GERD, LYNN, lumbar spondylolisthesis status post uncomplicated L4-L5 Posterior Decompression Instrumented Fusion 1/28/2020.   The patient tolerated the procedure well and was transferred to the floor in stable condition.  Remainder of hospital course eventful for MELITA (creatinine 1.8) which was treated and resolved with holding Lisinopril and IVF.  Surgical pain is controlled with current PO regimen.  Patient reports complete resolution of lower extremity pain and paresthesia.  Functional goals have been met. Rolling walker provided for gait stabilization.     Patient is neurologically intact; motor strength and sensation.  Medically cleared for discharge to home at this time.    HV discontinued at the bedside without any issues.  Incision site is well approximated, clean/dry/intact.  No erythema/swelling/drainage.     Keep incision site clean/dry/intact at all times.  Change dressing as needed.  Materials provided.     Restrictions for at least 8 weeks; no strenuous activity.  No lift/push/pull/carry objects greater than 10lbs. No repetitive bending/lifting/twisting/squatting.  No prolonged sitting/standing/walking greater than 30 minutes.  Advance activity as tolerated.    LSO brace with ambulation and during transport.

## 2020-01-30 NOTE — PROGRESS NOTE ADULT - SUBJECTIVE AND OBJECTIVE BOX
No acute events overnight. Pain well controlled with pain medications. Patient walked well with physical therapy.    Vital Signs Last 24 Hrs  T(C): 36.8 (30 Jan 2020 05:57), Max: 37.5 (29 Jan 2020 17:39)  T(F): 98.3 (30 Jan 2020 05:57), Max: 99.5 (29 Jan 2020 17:39)  HR: 70 (30 Jan 2020 05:57) (68 - 96)  BP: 139/45 (30 Jan 2020 05:57) (134/49 - 143/49)  BP(mean): 72 (29 Jan 2020 17:39) (72 - 72)  RR: 16 (30 Jan 2020 05:57) (16 - 16)  SpO2: 97% (30 Jan 2020 05:57) (92% - 97%)                                   10.5   12.77 )-----------( 132      ( 29 Jan 2020 05:00 )             32.7     01-29    134<L>  |  100  |  28<H>  ----------------------------<  120<H>  4.5   |  24  |  1.40<H>    Ca    9.0      29 Jan 2020 05:00    Exam:  Gen: NAD  BL LE: 5/5 TA/GS/EHL/Q/IP, SILT  HV output sanguinous, 85/215cc  Dressing: Clean, Dry, Intact    A/P: 75 year old M s/p L4-5 laminectomy/PSIF POD#2    - Pain Control  - Regular Diet  - Venodynes  - FU labs  - Monitor drain output  - PT/OT, OOB  - Discharge Planning

## 2020-01-30 NOTE — PROGRESS NOTE ADULT - PROBLEM SELECTOR PLAN 3
Suspect hemodynamic associated - resolved.  improved with IVF hydration.  Monitor BMP.  Avoid nephrotoxic agents.

## 2020-01-30 NOTE — DISCHARGE NOTE PROVIDER - NSDCACTIVITY_GEN_ALL_CORE
Walking - Outdoors allowed/Stairs allowed/Walking - Indoors allowed/Do not make important decisions/Showering allowed/Do not drive or operate machinery/No heavy lifting/straining

## 2020-01-30 NOTE — DISCHARGE NOTE NURSING/CASE MANAGEMENT/SOCIAL WORK - NSDPDISTO_GEN_ALL_CORE
Home/Pt. is afebrile and offers no complaints. In no acute distress. Lower back dressing with brace: clean, dry and intact. Pt is ambulating,  tolerating diet well, and voiding in adequate amounts.

## 2020-01-30 NOTE — DISCHARGE NOTE NURSING/CASE MANAGEMENT/SOCIAL WORK - NSDCPEEMAIL_GEN_ALL_CORE
[facility] email [email or web address]

## 2020-01-30 NOTE — DISCHARGE NOTE PROVIDER - HOSPITAL COURSE
The patient is a 76 y/o M PMH significant for Bladder CA s/p TURBT x 3 s/p chemo, HTN, GERD, LYNN, lumbar spondylolisthesis status post uncomplicated L4-L5 Posterior Decompression Instrumented Fusion 1/28/2020.   The patient tolerated the procedure well and was transferred to the floor in stable condition.  Remainder of hospital course eventful for MELITA (creatinine 1.8) which was treated and resolved with holding Lisinopril and IVF.  Surgical pain is controlled with current PO regimen.  Patient reports complete resolution of lower extremity pain and paresthesia.  Functional goals have been met. Rolling walker provided for gait stabilization.         Patient is neurologically intact; motor strength and sensation.  Medically cleared for discharge to home at this time.        HV discontinued at the bedside without any issues.  Incision site is well approximated, clean/dry/intact.  No erythema/swelling/drainage.         Keep incision site clean/dry/intact at all times.  Change dressing as needed.  Materials provided.         Restrictions for at least 8 weeks; no strenuous activity.  No lift/push/pull/carry objects greater than 10lbs. No repetitive bending/lifting/twisting/squatting.  No prolonged sitting/standing/walking greater than 30 minutes.  Advance activity as tolerated.        LSO brace with ambulation and during transport.        Follow-up with Dr. Horan for routine post-operative visit.  Call [phone] to schedule appointment. The patient is a 76 y/o M PMH significant for Bladder CA s/p TURBT x 3 s/p chemo, HTN, HLD, GERD, LYNN, lumbar spondylolisthesis status post uncomplicated L4-L5 Posterior Decompression Instrumented Fusion 1/28/2020.   The patient tolerated the procedure well and was transferred to the floor in stable condition.  Remainder of hospital course eventful for MELITA (creatinine 1.8) which was treated and resolved with holding Lisinopril and IVF.  Surgical pain is controlled with current PO regimen.  Patient reports complete resolution of lower extremity pain and paresthesia.  Functional goals have been met. Rolling walker provided for gait stabilization.         Patient is neurologically intact; motor strength and sensation.  Medically cleared for discharge to home at this time.        HV discontinued at the bedside without any issues.  Incision site is well approximated, clean/dry/intact.  No erythema/swelling/drainage.         Keep incision site clean/dry/intact at all times.  Change dressing as needed.  Materials provided.         Restrictions for at least 8 weeks; no strenuous activity.  No lift/push/pull/carry objects greater than 10lbs. No repetitive bending/lifting/twisting/squatting.  No prolonged sitting/standing/walking greater than 30 minutes.  Advance activity as tolerated.        LSO brace with ambulation and during transport.        Follow-up with Dr. Horan for routine post-operative visit.  Call [phone] to schedule appointment.

## 2020-01-30 NOTE — DISCHARGE NOTE PROVIDER - CARE PROVIDER_API CALL
Levar Horan (MD; DC)  Orthopaedic Surgery  [address], [facility] [address]  [place] [address]  Phone: [phone]  Fax: [phone]  Follow Up Time:

## 2020-01-30 NOTE — PROGRESS NOTE ADULT - SUBJECTIVE AND OBJECTIVE BOX
[facility] Division of Hospital Medicine  Marcos Ortiz MD  Pager (M-F, 8A-5P): [phone]  Other Times:  [phone]    Patient is a 75y old  Male who presents with a chief complaint of Open Lumbar laminectomy (29 Jan 2020 14:10)    SUBJECTIVE / OVERNIGHT EVENTS:  Patient offers no new complaints.  Offers no complaints.  No F/C, N/V, CP, SOB, Cough, lightheadedness, dizziness, abdominal pain, diarrhea, dysuria.    MEDICATIONS  (STANDING):  acetaminophen   Tablet .. 650 milliGRAM(s) Oral every 6 hours  amLODIPine   Tablet 5 milliGRAM(s) Oral daily  aspirin enteric coated 81 milliGRAM(s) Oral daily  atorvastatin 10 milliGRAM(s) Oral at bedtime  lactated ringers. 1000 milliLiter(s) (75 mL/Hr) IV Continuous <Continuous>  lisinopril 20 milliGRAM(s) Oral daily  pantoprazole    Tablet 40 milliGRAM(s) Oral before breakfast  polyethylene glycol 3350 17 Gram(s) Oral every 12 hours  senna 2 Tablet(s) Oral at bedtime  tamsulosin 0.4 milliGRAM(s) Oral at bedtime    MEDICATIONS  (PRN):  benzocaine 15 mG/menthol 3.6 mG (Sugar-Free) Lozenge 1 Lozenge Oral every 3 hours PRN Sore Throat  diazepam    Tablet 2 milliGRAM(s) Oral every 8 hours PRN spasm  HYDROmorphone  Injectable 0.5 milliGRAM(s) IV Push every 4 hours PRN breakthrough pain  naloxone Injectable 0.1 milliGRAM(s) IV Push every 3 minutes PRN For ANY of the following changes in patient status:  A. RR LESS THAN 10 breaths per minute, B. Oxygen saturation LESS THAN 90%, C. Sedation score of 6  ondansetron Injectable 4 milliGRAM(s) IV Push every 6 hours PRN Nausea  ondansetron Injectable 4 milliGRAM(s) IV Push every 6 hours PRN Nausea  oxyCODONE    IR 10 milliGRAM(s) Oral every 4 hours PRN Severe Pain (7 - 10)  oxyCODONE    IR 5 milliGRAM(s) Oral every 4 hours PRN Moderate Pain (4 - 6)      Vital Signs Last 24 Hrs  T(C): 36.6 (30 Jan 2020 08:39), Max: 37.5 (29 Jan 2020 17:39)  T(F): 97.8 (30 Jan 2020 08:39), Max: 99.5 (29 Jan 2020 17:39)  HR: 84 (30 Jan 2020 08:39) (70 - 96)  BP: 143/57 (30 Jan 2020 08:39) (134/49 - 143/57)  BP(mean): 72 (29 Jan 2020 17:39) (72 - 72)  RR: 16 (30 Jan 2020 08:39) (16 - 16)  SpO2: 98% (30 Jan 2020 08:39) (92% - 98%)  CAPILLARY BLOOD GLUCOSE        I&O's Summary    29 Jan 2020 07:01  -  30 Jan 2020 07:00  --------------------------------------------------------  IN: 750 mL / OUT: 3420 mL / NET: -2670 mL    30 Jan 2020 07:01  -  30 Jan 2020 11:05  --------------------------------------------------------  IN: 0 mL / OUT: 175 mL / NET: -175 mL        PHYSICAL EXAM:  GENERAL: NAD  HEAD:  Atraumatic, Normocephalic  EYES: EOMI, PERRLA, conjunctiva and sclera clear  NECK: Supple, No JVD  CHEST/LUNG: Clear to auscultation bilaterally; No wheeze  HEART: Regular rate and rhythm; No murmurs, rubs, or gallops  ABDOMEN: Soft, Nontender, Nondistended; Bowel sounds present  BACK: ROM limited due to pain.  Drain in place.  EXTREMITIES:  2+ Peripheral Pulses, No clubbing, cyanosis.  PSYCH: Calm  NEUROLOGY: AAOx3, non-focal neurological exam  SKIN: Surgical dressing C/D/I    LABS:                        11.0   8.48  )-----------( 111      ( 30 Jan 2020 06:36 )             34.4     01-30    138  |  101  |  26<H>  ----------------------------<  92  4.3   |  27  |  1.26    Ca    9.5      30 Jan 2020 06:36                RADIOLOGY & ADDITIONAL TESTS:    Imaging Personally Reviewed:    Care Discussed with Consultants/Other Providers:    Care Discussed with Orthopedic PA about:

## 2020-01-30 NOTE — DISCHARGE NOTE NURSING/CASE MANAGEMENT/SOCIAL WORK - NSDCPNINST_GEN_ALL_CORE
Call Dr. Horan for a follow up appointment. Call MD if you develop a fever, or if there is redness, swelling, drainage or pain not relieved by pain medication. No heavy lifting, bending, or straining to move your bowels. Take over the counter stool softeners as needed to prevent constipation which may be caused by pain medication.

## 2020-01-30 NOTE — DISCHARGE NOTE PROVIDER - NSDCCPCAREPLAN_GEN_ALL_CORE_FT
PRINCIPAL DISCHARGE DIAGNOSIS  Diagnosis: Spondylolisthesis of lumbar region  Assessment and Plan of Treatment:

## 2020-01-30 NOTE — DISCHARGE NOTE NURSING/CASE MANAGEMENT/SOCIAL WORK - PATIENT PORTAL LINK FT
You can access the FollowMyHealth Patient Portal offered by [facility] by registering at the following website: [email or web address]. By joining [employer]’s FollowMyHealth portal, you will also be able to view your health information using other applications (apps) compatible with our system.

## 2020-02-03 PROBLEM — K21.9 GASTRO-ESOPHAGEAL REFLUX DISEASE WITHOUT ESOPHAGITIS: Chronic | Status: ACTIVE | Noted: 2020-01-14

## 2020-02-03 PROBLEM — E66.9 OBESITY, UNSPECIFIED: Chronic | Status: ACTIVE | Noted: 2020-01-14

## 2020-02-05 ENCOUNTER — APPOINTMENT (OUTPATIENT)
Dept: ORTHOPEDIC SURGERY | Facility: CLINIC | Age: 75
End: 2020-02-05
Payer: MEDICARE

## 2020-02-05 PROCEDURE — 99024 POSTOP FOLLOW-UP VISIT: CPT

## 2020-02-05 PROCEDURE — 72100 X-RAY EXAM L-S SPINE 2/3 VWS: CPT

## 2020-03-02 ENCOUNTER — APPOINTMENT (OUTPATIENT)
Dept: ORTHOPEDIC SURGERY | Facility: CLINIC | Age: 75
End: 2020-03-02
Payer: MEDICARE

## 2020-03-02 PROCEDURE — 72100 X-RAY EXAM L-S SPINE 2/3 VWS: CPT

## 2020-03-02 PROCEDURE — 99024 POSTOP FOLLOW-UP VISIT: CPT

## 2020-04-27 ENCOUNTER — FORM ENCOUNTER (OUTPATIENT)
Age: 75
End: 2020-04-27

## 2020-04-27 ENCOUNTER — APPOINTMENT (OUTPATIENT)
Dept: ORTHOPEDIC SURGERY | Facility: CLINIC | Age: 75
End: 2020-04-27

## 2020-04-27 ENCOUNTER — APPOINTMENT (OUTPATIENT)
Dept: ORTHOPEDIC SURGERY | Facility: CLINIC | Age: 75
End: 2020-04-27
Payer: MEDICARE

## 2020-04-27 PROCEDURE — 99024 POSTOP FOLLOW-UP VISIT: CPT

## 2020-04-27 NOTE — HISTORY OF PRESENT ILLNESS
[Home] : at home, [unfilled] , at the time of the visit. [Other Location: e.g. Home (Enter Location, City,State)___] : at [unfilled] [Patient] : the patient [Improving] : improving [de-identified] : 3 months s/p lumbar decompression and fusion with legs much better.\par Doing home exercises. \par Patient with hip arthritis.\par Surgery 1/28/2020\par Ambulating well.\par Surgery helped a lot.\par Uses Diclofenac.\par No fever chills sweats nausea vomiting no bowel or bladder dysfunction, no recent weight loss or gain no night pain. This history is in addition to the intake form that I personally reviewed.

## 2020-04-27 NOTE — PHYSICAL EXAM
[Normal] : Gait: normal [de-identified] : adequate lower extremity motor strength, sensation is intact and symmetrical full range of motion flexion extension and rotation, no palpatory tenderness limited right hip ROM, adequate full range of motion of hip knees shoulders and elbows (all four extremities), no atrophy, negative straight leg raise, no pathological reflexes, no swelling, normal ambulation, no apparent distress skin is intact, no palpable lymph nodes, no upper or lower extremity instability, alert and oriented x3 and normal mood. Normal finger-to nose test.

## 2020-04-27 NOTE — DISCUSSION/SUMMARY
[de-identified] : 3 months s/p lumbar decompression and fusion. \par Discussed all options.\par Continue home HEP.\par F/U 2 months for xrays-hips and lumbar.\par All questions were answered, all alternatives discussed and the patient is in complete agreement with that plan. Follow-up appointment as instructed. Any issues and the patient will call or come in sooner.

## 2020-07-06 ENCOUNTER — APPOINTMENT (OUTPATIENT)
Dept: ORTHOPEDIC SURGERY | Facility: CLINIC | Age: 75
End: 2020-07-06
Payer: MEDICARE

## 2020-07-06 VITALS
DIASTOLIC BLOOD PRESSURE: 57 MMHG | BODY MASS INDEX: 29.49 KG/M2 | WEIGHT: 206 LBS | HEIGHT: 70 IN | HEART RATE: 91 BPM | SYSTOLIC BLOOD PRESSURE: 150 MMHG

## 2020-07-06 VITALS — TEMPERATURE: 97.7 F

## 2020-07-06 DIAGNOSIS — M54.5 LOW BACK PAIN: ICD-10-CM

## 2020-07-06 PROCEDURE — 72100 X-RAY EXAM L-S SPINE 2/3 VWS: CPT

## 2020-07-06 PROCEDURE — 20552 NJX 1/MLT TRIGGER POINT 1/2: CPT

## 2020-07-06 PROCEDURE — 99214 OFFICE O/P EST MOD 30 MIN: CPT | Mod: 25

## 2020-07-06 RX ORDER — OMEPRAZOLE 40 MG/1
40 CAPSULE, DELAYED RELEASE ORAL
Qty: 30 | Refills: 0 | Status: ACTIVE | COMMUNITY
Start: 2019-11-06

## 2021-01-06 ENCOUNTER — APPOINTMENT (OUTPATIENT)
Dept: ORTHOPEDIC SURGERY | Facility: CLINIC | Age: 76
End: 2021-01-06
Payer: MEDICARE

## 2021-01-06 VITALS — HEART RATE: 84 BPM | SYSTOLIC BLOOD PRESSURE: 162 MMHG | DIASTOLIC BLOOD PRESSURE: 75 MMHG

## 2021-01-06 VITALS — TEMPERATURE: 96.3 F

## 2021-01-06 DIAGNOSIS — M79.10 MYALGIA, UNSPECIFIED SITE: ICD-10-CM

## 2021-01-06 DIAGNOSIS — M60.9 MYOSITIS, UNSPECIFIED: ICD-10-CM

## 2021-01-06 DIAGNOSIS — M43.16 SPONDYLOLISTHESIS, LUMBAR REGION: ICD-10-CM

## 2021-01-06 PROCEDURE — 20552 NJX 1/MLT TRIGGER POINT 1/2: CPT

## 2021-01-06 PROCEDURE — 99214 OFFICE O/P EST MOD 30 MIN: CPT | Mod: 25

## 2021-01-06 PROCEDURE — 72100 X-RAY EXAM L-S SPINE 2/3 VWS: CPT

## 2021-03-08 ENCOUNTER — APPOINTMENT (OUTPATIENT)
Dept: THORACIC SURGERY | Facility: CLINIC | Age: 76
End: 2021-03-08
Payer: MEDICARE

## 2021-03-08 ENCOUNTER — NON-APPOINTMENT (OUTPATIENT)
Age: 76
End: 2021-03-08

## 2021-03-08 VITALS — WEIGHT: 206 LBS | HEIGHT: 70 IN | BODY MASS INDEX: 29.49 KG/M2

## 2021-03-08 DIAGNOSIS — Z12.2 ENCOUNTER FOR SCREENING FOR MALIGNANT NEOPLASM OF RESPIRATORY ORGANS: ICD-10-CM

## 2021-03-08 PROCEDURE — G0296 VISIT TO DETERM LDCT ELIG: CPT

## 2021-03-09 ENCOUNTER — OUTPATIENT (OUTPATIENT)
Dept: OUTPATIENT SERVICES | Facility: HOSPITAL | Age: 76
LOS: 1 days | End: 2021-03-09
Payer: MEDICARE

## 2021-03-09 ENCOUNTER — APPOINTMENT (OUTPATIENT)
Dept: CT IMAGING | Facility: CLINIC | Age: 76
End: 2021-03-09
Payer: MEDICARE

## 2021-03-09 DIAGNOSIS — Z95.4 PRESENCE OF OTHER HEART-VALVE REPLACEMENT: Chronic | ICD-10-CM

## 2021-03-09 DIAGNOSIS — Z87.891 PERSONAL HISTORY OF NICOTINE DEPENDENCE: ICD-10-CM

## 2021-03-09 DIAGNOSIS — Z98.89 OTHER SPECIFIED POSTPROCEDURAL STATES: Chronic | ICD-10-CM

## 2021-03-09 DIAGNOSIS — C67.9 MALIGNANT NEOPLASM OF BLADDER, UNSPECIFIED: Chronic | ICD-10-CM

## 2021-03-09 PROCEDURE — 71271 CT THORAX LUNG CANCER SCR C-: CPT

## 2021-03-09 PROCEDURE — 71271 CT THORAX LUNG CANCER SCR C-: CPT | Mod: 26,MH

## 2021-03-18 NOTE — HISTORY OF PRESENT ILLNESS
[TextBox_13] : He denies hemoptysis, denies new cough, denies unexplained weight loss.\par He is a former smoker with a 55 pack year smoking history (1PPD x 55 years).  He quit 11 years ago, but has been using e-cigarettes since then.  He has a h/o bladder cancer (2019).  He has a famly h/o lung cancer (father).  He is referred by Dr. Jona Padilla.\par

## 2021-03-18 NOTE — PLAN
[FreeTextEntry1] : His LDCT is scheduled for 3/9/21 at the [facility].  He will follow up with Dr. Padilla for the results.

## 2021-05-05 ENCOUNTER — APPOINTMENT (OUTPATIENT)
Dept: VASCULAR SURGERY | Facility: CLINIC | Age: 76
End: 2021-05-05
Payer: MEDICARE

## 2021-05-05 VITALS
DIASTOLIC BLOOD PRESSURE: 71 MMHG | WEIGHT: 209 LBS | BODY MASS INDEX: 29.92 KG/M2 | TEMPERATURE: 97.3 F | HEIGHT: 70 IN | SYSTOLIC BLOOD PRESSURE: 156 MMHG | HEART RATE: 78 BPM

## 2021-05-05 DIAGNOSIS — I70.219 ATHEROSCLEROSIS OF NATIVE ARTERIES OF EXTREMITIES WITH INTERMITTENT CLAUDICATION, UNSPECIFIED EXTREMITY: ICD-10-CM

## 2021-05-05 PROCEDURE — 99203 OFFICE O/P NEW LOW 30 MIN: CPT

## 2021-05-05 PROCEDURE — 93923 UPR/LXTR ART STDY 3+ LVLS: CPT

## 2021-05-05 PROCEDURE — 93978 VASCULAR STUDY: CPT

## 2021-06-11 ENCOUNTER — APPOINTMENT (OUTPATIENT)
Dept: CT IMAGING | Facility: CLINIC | Age: 76
End: 2021-06-11
Payer: MEDICARE

## 2021-06-11 ENCOUNTER — OUTPATIENT (OUTPATIENT)
Dept: OUTPATIENT SERVICES | Facility: HOSPITAL | Age: 76
LOS: 1 days | End: 2021-06-11
Payer: MEDICARE

## 2021-06-11 DIAGNOSIS — C67.9 MALIGNANT NEOPLASM OF BLADDER, UNSPECIFIED: Chronic | ICD-10-CM

## 2021-06-11 DIAGNOSIS — Z98.89 OTHER SPECIFIED POSTPROCEDURAL STATES: Chronic | ICD-10-CM

## 2021-06-11 DIAGNOSIS — Z00.00 ENCOUNTER FOR GENERAL ADULT MEDICAL EXAMINATION WITHOUT ABNORMAL FINDINGS: ICD-10-CM

## 2021-06-11 DIAGNOSIS — Z95.4 PRESENCE OF OTHER HEART-VALVE REPLACEMENT: Chronic | ICD-10-CM

## 2021-06-11 PROCEDURE — 71250 CT THORAX DX C-: CPT | Mod: 26,MH

## 2021-06-11 PROCEDURE — 71250 CT THORAX DX C-: CPT

## 2021-08-25 ENCOUNTER — APPOINTMENT (OUTPATIENT)
Dept: VASCULAR SURGERY | Facility: CLINIC | Age: 76
End: 2021-08-25

## 2021-12-23 ENCOUNTER — RX RENEWAL (OUTPATIENT)
Age: 76
End: 2021-12-23

## 2022-05-24 ENCOUNTER — APPOINTMENT (OUTPATIENT)
Dept: ORTHOPEDIC SURGERY | Facility: CLINIC | Age: 77
End: 2022-05-24
Payer: MEDICARE

## 2022-05-24 VITALS
BODY MASS INDEX: 29.49 KG/M2 | HEIGHT: 70 IN | SYSTOLIC BLOOD PRESSURE: 148 MMHG | HEART RATE: 81 BPM | DIASTOLIC BLOOD PRESSURE: 63 MMHG | WEIGHT: 206 LBS

## 2022-05-24 DIAGNOSIS — Z82.49 FAMILY HISTORY OF ISCHEMIC HEART DISEASE AND OTHER DISEASES OF THE CIRCULATORY SYSTEM: ICD-10-CM

## 2022-05-24 DIAGNOSIS — Z87.09 PERSONAL HISTORY OF OTHER DISEASES OF THE RESPIRATORY SYSTEM: ICD-10-CM

## 2022-05-24 DIAGNOSIS — Z78.9 OTHER SPECIFIED HEALTH STATUS: ICD-10-CM

## 2022-05-24 DIAGNOSIS — Z82.3 FAMILY HISTORY OF STROKE: ICD-10-CM

## 2022-05-24 DIAGNOSIS — M25.551 PAIN IN RIGHT HIP: ICD-10-CM

## 2022-05-24 DIAGNOSIS — Z80.8 FAMILY HISTORY OF MALIGNANT NEOPLASM OF OTHER ORGANS OR SYSTEMS: ICD-10-CM

## 2022-05-24 PROCEDURE — 73502 X-RAY EXAM HIP UNI 2-3 VIEWS: CPT | Mod: RT

## 2022-05-24 PROCEDURE — 99214 OFFICE O/P EST MOD 30 MIN: CPT

## 2022-05-24 RX ORDER — INDAPAMIDE 2.5 MG/1
2.5 TABLET, FILM COATED ORAL
Refills: 0 | Status: ACTIVE | COMMUNITY

## 2022-05-24 RX ORDER — PRAVASTATIN SODIUM 80 MG/1
TABLET ORAL
Refills: 0 | Status: DISCONTINUED | COMMUNITY
End: 2022-05-24

## 2022-05-24 RX ORDER — AMLODIPINE BESYLATE 5 MG/1
TABLET ORAL
Refills: 0 | Status: DISCONTINUED | COMMUNITY
End: 2022-05-24

## 2022-05-24 RX ORDER — LISINOPRIL 40 MG/1
40 TABLET ORAL
Refills: 0 | Status: ACTIVE | COMMUNITY

## 2022-05-24 RX ORDER — PRAVASTATIN SODIUM 20 MG/1
20 TABLET ORAL
Refills: 0 | Status: ACTIVE | COMMUNITY

## 2022-05-24 RX ORDER — EZETIMIBE 10 MG/1
10 TABLET ORAL
Refills: 0 | Status: ACTIVE | COMMUNITY

## 2022-05-24 RX ORDER — OMEPRAZOLE 20 MG/1
20 CAPSULE, DELAYED RELEASE ORAL
Refills: 0 | Status: DISCONTINUED | COMMUNITY
End: 2022-05-24

## 2022-05-24 RX ORDER — LISINOPRIL 30 MG/1
TABLET ORAL
Refills: 0 | Status: DISCONTINUED | COMMUNITY
End: 2022-05-24

## 2022-05-24 RX ORDER — AMLODIPINE BESYLATE 5 MG/1
5 TABLET ORAL
Refills: 0 | Status: ACTIVE | COMMUNITY

## 2022-05-24 RX ORDER — CHOLECALCIFEROL (VITAMIN D3) 25 MCG
TABLET ORAL
Refills: 0 | Status: ACTIVE | COMMUNITY

## 2022-06-21 ENCOUNTER — APPOINTMENT (OUTPATIENT)
Dept: ORTHOPEDIC SURGERY | Facility: CLINIC | Age: 77
End: 2022-06-21
Payer: MEDICARE

## 2022-06-21 VITALS — WEIGHT: 206 LBS | HEIGHT: 70 IN | BODY MASS INDEX: 29.49 KG/M2

## 2022-06-21 DIAGNOSIS — M48.07 SPINAL STENOSIS, LUMBOSACRAL REGION: ICD-10-CM

## 2022-06-21 PROCEDURE — 72170 X-RAY EXAM OF PELVIS: CPT

## 2022-06-21 PROCEDURE — 72100 X-RAY EXAM L-S SPINE 2/3 VWS: CPT

## 2022-06-21 PROCEDURE — 99214 OFFICE O/P EST MOD 30 MIN: CPT

## 2023-03-21 ENCOUNTER — APPOINTMENT (OUTPATIENT)
Dept: OTOLARYNGOLOGY | Facility: CLINIC | Age: 78
End: 2023-03-21
Payer: MEDICARE

## 2023-03-21 VITALS
TEMPERATURE: 98.2 F | SYSTOLIC BLOOD PRESSURE: 150 MMHG | HEIGHT: 70 IN | BODY MASS INDEX: 29.49 KG/M2 | DIASTOLIC BLOOD PRESSURE: 70 MMHG | HEART RATE: 86 BPM | WEIGHT: 206 LBS

## 2023-03-21 DIAGNOSIS — Z85.51 PERSONAL HISTORY OF MALIGNANT NEOPLASM OF BLADDER: ICD-10-CM

## 2023-03-21 PROCEDURE — 99204 OFFICE O/P NEW MOD 45 MIN: CPT | Mod: 25

## 2023-03-21 PROCEDURE — 31237 NSL/SINS NDSC SURG BX POLYPC: CPT

## 2023-03-21 RX ORDER — CLOPIDOGREL BISULFATE 75 MG/1
75 TABLET, FILM COATED ORAL
Qty: 30 | Refills: 0 | Status: COMPLETED | COMMUNITY
Start: 2022-02-28 | End: 2023-03-21

## 2023-03-21 RX ORDER — OXYCODONE 10 MG/1
10 TABLET ORAL
Qty: 20 | Refills: 0 | Status: COMPLETED | COMMUNITY
Start: 2020-01-30 | End: 2023-03-21

## 2023-03-21 RX ORDER — SULFAMETHOXAZOLE AND TRIMETHOPRIM 800; 160 MG/1; MG/1
800-160 TABLET ORAL
Qty: 10 | Refills: 0 | Status: COMPLETED | COMMUNITY
Start: 2022-03-30 | End: 2023-03-21

## 2023-03-21 RX ORDER — DIAZEPAM 2 MG/1
2 TABLET ORAL
Qty: 21 | Refills: 0 | Status: COMPLETED | COMMUNITY
Start: 2020-01-30 | End: 2023-03-21

## 2023-03-21 RX ORDER — DICLOFENAC SODIUM 75 MG/1
75 TABLET, DELAYED RELEASE ORAL
Qty: 30 | Refills: 0 | Status: COMPLETED | COMMUNITY
Start: 2020-02-11 | End: 2023-03-21

## 2023-03-21 RX ORDER — PREGABALIN 25 MG/1
25 CAPSULE ORAL
Qty: 60 | Refills: 0 | Status: COMPLETED | COMMUNITY
Start: 2022-03-30 | End: 2023-03-21

## 2023-03-21 RX ORDER — GABAPENTIN 100 MG/1
100 CAPSULE ORAL
Qty: 90 | Refills: 0 | Status: COMPLETED | COMMUNITY
Start: 2022-02-21 | End: 2023-03-21

## 2023-03-21 RX ORDER — CLINDAMYCIN HYDROCHLORIDE 150 MG/1
150 CAPSULE ORAL
Qty: 40 | Refills: 0 | Status: COMPLETED | COMMUNITY
Start: 2022-05-08 | End: 2023-03-21

## 2023-03-21 RX ORDER — DICLOFENAC SODIUM 75 MG/1
75 TABLET, DELAYED RELEASE ORAL
Qty: 60 | Refills: 2 | Status: COMPLETED | COMMUNITY
Start: 2020-03-02 | End: 2023-03-21

## 2023-03-21 RX ORDER — OXYCODONE 5 MG/1
5 TABLET ORAL EVERY 6 HOURS
Qty: 28 | Refills: 0 | Status: COMPLETED | COMMUNITY
Start: 2020-02-05 | End: 2023-03-21

## 2023-03-21 RX ORDER — DICLOFENAC SODIUM 100 MG/1
100 TABLET, FILM COATED, EXTENDED RELEASE ORAL
Qty: 10 | Refills: 0 | Status: COMPLETED | COMMUNITY
Start: 2022-03-30 | End: 2023-03-21

## 2023-03-21 RX ORDER — TIZANIDINE 4 MG/1
4 TABLET ORAL
Qty: 30 | Refills: 0 | Status: COMPLETED | COMMUNITY
Start: 2021-01-06 | End: 2023-03-21

## 2023-03-21 RX ORDER — TIZANIDINE 4 MG/1
4 TABLET ORAL
Qty: 60 | Refills: 1 | Status: COMPLETED | COMMUNITY
Start: 2020-02-05 | End: 2023-03-21

## 2023-03-21 RX ORDER — AMOXICILLIN AND CLAVULANATE POTASSIUM 875; 125 MG/1; MG/1
875-125 TABLET, COATED ORAL
Qty: 20 | Refills: 0 | Status: COMPLETED | COMMUNITY
Start: 2020-04-06 | End: 2023-03-21

## 2023-03-21 RX ORDER — MELOXICAM 7.5 MG/1
7.5 TABLET ORAL
Qty: 60 | Refills: 0 | Status: COMPLETED | COMMUNITY
Start: 2022-03-09 | End: 2023-03-21

## 2023-03-21 RX ORDER — TIZANIDINE 4 MG/1
4 TABLET ORAL
Qty: 30 | Refills: 5 | Status: COMPLETED | COMMUNITY
Start: 2020-03-02 | End: 2023-03-21

## 2023-03-22 PROBLEM — Z85.51 HISTORY OF MALIGNANT NEOPLASM OF BLADDER: Status: RESOLVED | Noted: 2023-03-21 | Resolved: 2023-03-22

## 2023-03-22 NOTE — HISTORY OF PRESENT ILLNESS
[de-identified] : 78 year yoM referred by Dr. Clement for R ulcerated lesion in R nose. Biopsy taken on 2/2023 - benign. Has history of nasal congestion, postnasal drip and was prescribed multiple rounds of medications (antibiotics/sprays). Admits to chronic sinus infections about once a year. Still on omeprazole for reflux. Has history of epistaxis x2, last instance about 1 week ago that bled for over 2 hours. Uses nasal lavage 3x a day. Had CT scan (3-4 weeks ago) at [facility] \par \par Pathology (3/2/23)\par Nasal cavity, right nasal septum ulcer, biopsy\par - Nasal mucosa with ulceration, acute inflammatory exudate and bacterial colonizations\par - Squamous hyperplasia and papillary hyperplasia suggestive of exophytic sinonasal papilloma; clinical correlation is recommended \par

## 2023-03-22 NOTE — REASON FOR VISIT
[Initial Consultation] : an initial consultation for [FreeTextEntry2] : Referred by Dr. Feliciano for R ulcerated lesion in R nose

## 2023-03-22 NOTE — CONSULT LETTER
[Consult Letter:] : I had the pleasure of evaluating your patient, [unfilled]. [Please see my note below.] : Please see my note below. [Consult Closing:] : Thank you very much for allowing me to participate in the care of this patient.  If you have any questions, please do not hesitate to contact me. [Sincerely,] : Sincerely, [FreeTextEntry2] : Dr. Hinkle [FreeTextEntry3] : Mike Feliciano MD, REGI \par Otolaryngology \par Sinus and Endoscopic Skull Base Surgery \par Head and Neck Surgery \par \par [address] [facility], [facility] [address] \[place] [address] \par \par [address] [place] [address], \[place] [address] \par Tel: [phone] \par Fax:[phone]\par

## 2023-03-22 NOTE — PHYSICAL EXAM
[Normal] : external appearance is normal [FreeTextEntry1] : Right side of septum with 1cm ulcerated lesion

## 2023-03-22 NOTE — PROCEDURE
[FreeTextEntry6] : Bilateral nasal endoscopy:\par \par Left:\par Septum midline\par Mild inferior turbinate hypertrophy \par Middle meatus clear, without masses, polyps, or pus\par Sphenoethmoidal recess clear, without masses, polyps, or pus\par \par Right: \par Septum with 1.5cm ulceration. Not like an exophytic papilloma as pathology may have suggested. The ulceration had fungal debris and crusting over the top, but beneath the crust the edges were viable. There was fibrinous exudate along the central portion. The center had ulcerated through the cartilage. The contralateral elft sided mucosa was intact. \par Mild inferior turbinate hypertrophy\par Middle meatus clear, without masses, polyps, or pus \par Sphenoethmoidal recess clear, without masses, polyps, or pus\par \par Informed consent was obtained. A small cup forceps was used to biopsy the central portion of the lesion. Careful attention was paid not to violate the contralateral mucosa and create a perforation. Hemostasis was achieved with afrin soaked cotton.

## 2023-03-28 ENCOUNTER — APPOINTMENT (OUTPATIENT)
Dept: OTOLARYNGOLOGY | Facility: CLINIC | Age: 78
End: 2023-03-28

## 2023-03-29 LAB — CORE LAB BIOPSY: NORMAL

## 2023-04-19 ENCOUNTER — OUTPATIENT (OUTPATIENT)
Dept: OUTPATIENT SERVICES | Facility: HOSPITAL | Age: 78
LOS: 1 days | End: 2023-04-19
Payer: MEDICARE

## 2023-04-19 VITALS
SYSTOLIC BLOOD PRESSURE: 138 MMHG | RESPIRATION RATE: 20 BRPM | TEMPERATURE: 97 F | HEART RATE: 77 BPM | OXYGEN SATURATION: 96 % | DIASTOLIC BLOOD PRESSURE: 52 MMHG | WEIGHT: 194.67 LBS | HEIGHT: 70 IN

## 2023-04-19 DIAGNOSIS — Z90.49 ACQUIRED ABSENCE OF OTHER SPECIFIED PARTS OF DIGESTIVE TRACT: Chronic | ICD-10-CM

## 2023-04-19 DIAGNOSIS — I25.10 ATHEROSCLEROTIC HEART DISEASE OF NATIVE CORONARY ARTERY WITHOUT ANGINA PECTORIS: ICD-10-CM

## 2023-04-19 DIAGNOSIS — Z98.89 OTHER SPECIFIED POSTPROCEDURAL STATES: Chronic | ICD-10-CM

## 2023-04-19 DIAGNOSIS — Z91.89 OTHER SPECIFIED PERSONAL RISK FACTORS, NOT ELSEWHERE CLASSIFIED: ICD-10-CM

## 2023-04-19 DIAGNOSIS — C67.9 MALIGNANT NEOPLASM OF BLADDER, UNSPECIFIED: Chronic | ICD-10-CM

## 2023-04-19 DIAGNOSIS — Z29.9 ENCOUNTER FOR PROPHYLACTIC MEASURES, UNSPECIFIED: ICD-10-CM

## 2023-04-19 DIAGNOSIS — Z01.818 ENCOUNTER FOR OTHER PREPROCEDURAL EXAMINATION: ICD-10-CM

## 2023-04-19 DIAGNOSIS — Z13.89 ENCOUNTER FOR SCREENING FOR OTHER DISORDER: ICD-10-CM

## 2023-04-19 DIAGNOSIS — Z95.4 PRESENCE OF OTHER HEART-VALVE REPLACEMENT: Chronic | ICD-10-CM

## 2023-04-19 DIAGNOSIS — J34.0 ABSCESS, FURUNCLE AND CARBUNCLE OF NOSE: ICD-10-CM

## 2023-04-19 DIAGNOSIS — I10 ESSENTIAL (PRIMARY) HYPERTENSION: ICD-10-CM

## 2023-04-19 LAB
A1C WITH ESTIMATED AVERAGE GLUCOSE RESULT: 6 % — HIGH (ref 4–5.6)
APTT BLD: 29.6 SEC — SIGNIFICANT CHANGE UP (ref 27.5–35.5)
BLD GP AB SCN SERPL QL: SIGNIFICANT CHANGE UP
ESTIMATED AVERAGE GLUCOSE: 126 MG/DL — HIGH (ref 68–114)
INR BLD: 1.12 RATIO — SIGNIFICANT CHANGE UP (ref 0.88–1.16)
PROTHROM AB SERPL-ACNC: 13 SEC — SIGNIFICANT CHANGE UP (ref 10.5–13.4)

## 2023-04-19 PROCEDURE — G0463: CPT

## 2023-04-19 RX ORDER — LISINOPRIL 2.5 MG/1
1 TABLET ORAL
Qty: 0 | Refills: 0 | DISCHARGE

## 2023-04-19 RX ORDER — TAMSULOSIN HYDROCHLORIDE 0.4 MG/1
1 CAPSULE ORAL
Qty: 0 | Refills: 0 | DISCHARGE

## 2023-04-19 NOTE — H&P PST ADULT - EKG AND INTERPRETATION
EKG from cardiologist Dr. Johnson reviewed  from 4/12/2023 EKG from cardiologist Dr. Johnson reviewed  from 4/12/2023 copy placed in chart  NSR with 1st degree heart block VR 76bpm

## 2023-04-19 NOTE — H&P PST ADULT - HISTORY OF PRESENT ILLNESS
Patient is a 78 year old  male presenting today for PST, PMH includes     Dr. Clement (ENT) for R ulcerated lesion in R nose. Biopsy taken on 2/2023 - benign. Has history of nasal congestion, postnasal drip and was prescribed multiple rounds of medications (antibiotics/sprays). Admits to chronic sinus infections about once a year. Still on omeprazole for reflux. Has history of epistaxis x2, last instance about 3-4 week ago that bled for over 2 hours. Uses nasal lavage 3x a day. Had CT scan 2 months ago    (3-4 weeks ago) at [facility]     Pathology (3/2/23)  Nasal cavity, right nasal septum ulcer, biopsy  - Nasal mucosa with ulceration, acute inflammatory exudate and bacterial colonizations  - Squamous hyperplasia and papillary hyperplasia suggestive of exophytic sinonasal papilloma; clinical correlation is recommended  Patient is a 78 year old  male presenting today for PST, PMH includes COPD, CAD s/p valve replacement, HTN, high cholesterol, and diverticulitis. Patient c/o nasal congestion and post nasal drip. States he has been using prescribed nose sprays for "a while" for nasal congestion, he has had multiple rounds of medication, with no relief. He was seen by  Dr. Clement (ENT) and diagnosed with a right ulcerated lesion on his right side of his nose. Reports chronic sinus infections, at least 1 a year. Has history of epistaxis x2, last instance about 3-4 week ago that bled for over 2 hours. Reports using a nasal lavage 3x a day.  As per chart  Biopsy taken on 2/2023. - benign.  CT scan 2 months ago  at [facility].  Pathology (3/2/23): Nasal cavity, right nasal septum ulcer, biopsy. Nasal mucosa with ulceration, acute inflammatory exudate and bacterial colonizations. 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 Denies sinus pressure. Now scheduled for resection septal ulcer with septal reconstruction with mucosal graft on 4/26/2023 with Dr. Feliciano pending medical and cardiac clearance.

## 2023-04-19 NOTE — H&P PST ADULT - PROBLEM SELECTOR PLAN 5
BP today 138/52.  Continue medication.   EKG performed.  Patient instructed to take bp medications as prescribed with a sip of water morning of procedure, verbalized understanding.   Asked the patient to consult with cardiologist about holding ASA and the patient  agreed.  Cardiac clearance pending.

## 2023-04-19 NOTE — H&P PST ADULT - ASSESSMENT
Patient is a 78 year old  male presenting today for PST, PMH includes COPD, CAD s/p valve replacement, HTN, high cholesterol, and diverticulitis. Patient c/o nasal congestion and post nasal drip. States he has been using prescribed nose sprays for "a while" for nasal congestion, he has had multiple rounds of medication, with no relief. He was seen by  Dr. Clement (ENT) and diagnosed with a right ulcerated lesion on his right side of his nose. Reports chronic sinus infections, at least 1 a year. Has history of epistaxis x2, last instance about 3-4 week ago that bled for over 2 hours. Reports using a nasal lavage 3x a day.  As per chart  Biopsy taken on 2/2023. - benign.  CT scan 2 months ago  at [facility].  Pathology (3/2/23): Nasal cavity, right nasal septum ulcer, biopsy. Nasal mucosa with ulceration, acute inflammatory exudate and bacterial colonizations. 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 Denies sinus pressure. Now scheduled for resection septal ulcer with septal reconstruction with mucosal graft on 4/26/2023 with Dr. Feliciano pending medical and cardiac clearance.  This is a pleasant 78 year old  male in NAD presenting today for PST, PMH includes COPD, CAD s/p valve replacement, prostate cancer, HTN, high cholesterol, and diverticulitis. Patient c/o nasal congestion and post nasal drip. States he has been using prescribed nose sprays for "a while" for nasal congestion, he has had multiple rounds of medication, with no relief. He was seen by  Dr. Clement (ENT) and diagnosed with a right ulcerated lesion on his right side of his nose. Reports chronic sinus infections, at least 1 a year. Has history of epistaxis x2, last instance about 3-4 week ago that bled for over 2 hours. Reports using a nasal lavage 3x a day.  As per chart  Biopsy taken on 2023. - benign.  CT scan 2 months ago  at [facility].  Pathology (3/2/23): Nasal cavity, right nasal septum ulcer, biopsy. Nasal mucosa with ulceration, acute inflammatory exudate and bacterial colonizations. 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 Denies sinus pressure. Now scheduled for resection septal ulcer with septal reconstruction with mucosal graft on 2023 with Dr. Feliciano pending medical and cardiac clearance.     CAPRINI SCORE    AGE RELATED RISK FACTORS                                                             [ ] Age 41-60 years                                            (1 Point)  [ ] Age: 61-74 years                                           (2 Points)                 [X ] Age= 75 years                                                (3 Points)             DISEASE RELATED RISK FACTORS                                                       [ ] Edema in the lower extremities                 (1 Point)                     [ ] Varicose veins                                               (1 Point)                                 [ X] BMI > 25 Kg/m2                                            (1 Point)                                  [ ] Serious infection (ie PNA)                            (1 Point)                     [X ] Lung disease ( COPD, Emphysema)            (1 Point)                                                                          [ ] Acute myocardial infarction                         (1 Point)                  [ ] Congestive heart failure (in the previous month)  (1 Point)         [ ] Inflammatory bowel disease                            (1 Point)                  [ ] Central venous access, PICC or Port               (2 points)       (within the last month)                                                                [ ] Stroke (in the previous month)                        (5 Points)    [X ] Previous or present malignancy                       (2 points)                                                                                                                                                         HEMATOLOGY RELATED FACTORS                                                         [ ] Prior episodes of VTE                                     (3 Points)                     [X ] Positive family history for VTE                      (3 Points)                  [ ] Prothrombin 94106 A                                     (3 Points)                     [ ] Factor V Leiden                                                (3 Points)                        [ ] Lupus anticoagulants                                      (3 Points)                                                           [ ] Anticardiolipin antibodies                              (3 Points)                                                       [ ] High homocysteine in the blood                   (3 Points)                                             [ ] Other congenital or acquired thrombophilia      (3 Points)                                                [ ] Heparin induced thrombocytopenia                  (3 Points)                                        MOBILITY RELATED FACTORS  [ ] Bed rest                                                         (1 Point)  [ ] Plaster cast                                                    (2 points)  [ ] Bed bound for more than 72 hours           (2 Points)    GENDER SPECIFIC FACTORS  [ ] Pregnancy or had a baby within the last month   (1 Point)  [ ] Post-partum < 6 weeks                                   (1 Point)  [ ] Hormonal therapy  or oral contraception   (1 Point)  [ ] History of pregnancy complications              (1 point)  [ ] Unexplained or recurrent              (1 Point)    OTHER RISK FACTORS                                           (1 Point)  [ ] BMI >40, smoking, diabetes requiring insulin, chemotherapy  blood transfusions and length of surgery over 2 hours    SURGERY RELATED RISK FACTORS  [ ]  Section within the last month     (1 Point)  [ ] Minor surgery                                                  (1 Point)  [ ] Arthroscopic surgery                                       (2 Points)  [X ] Planned major surgery lasting more            (2 Points)      than 45 minutes     [ ] Elective hip or knee joint replacement       (5 points)       surgery                                                TRAUMA RELATED RISK FACTORS  [ ] Fracture of the hip, pelvis, or leg                       (5 Points)  [ ] Spinal cord injury resulting in paralysis             (5 points)       (in the previous month)    [ ] Paralysis  (less than 1 month)                             (5 Points)  [ ] Multiple Trauma within 1 month                        (5 Points)    Total Score [   12     ]    Caprini Score 0-2: Low Risk, NO VTE prophylaxis required for most patients, encourage ambulation  Caprini Score 3-6: Moderate Risk , pharmacologic VTE prophylaxis is indicated for most patients (in the absence of contraindications)  Caprini Score Greater than or =7: High risk, pharmocologic VTE prophylaxis indicated for most patients (in the absence of contraindications)      OPIOID RISK TOOL    PREMA EACH BOX THAT APPLIES AND ADD TOTALS AT THE END    FAMILY HISTORY OF SUBSTANCE ABUSE                 FEMALE         MALE                                                Alcohol                             [  ]1 pt          [  ]3pts                                               Illegal Durgs                     [  ]2 pts        [  ]3pts                                               Rx Drugs                           [  ]4 pts        [  ]4 pts    PERSONAL HISTORY OF SUBSTANCE ABUSE                                                                                          Alcohol                             [  ]3 pts       [  ]3 pts                                               Illegal Drugs                     [  ]4 pts        [  ]4 pts                                               Rx Drugs                           [  ]5 pts        [  ]5 pts    AGE BETWEEN 16-45 YEARS                                      [  ]1 pt         [  ]1 pt    HISTORY OF PREADOLESCENT   SEXUAL ABUSE                                                             [  ]3 pts        [  ]0pts    PSYCHOLOGICAL DISEASE                     ADD, OCD, Bipolar, Schizophrenia        [  ]2 pts         [  ]2 pts                      Depression                                               [  ]1 pt           [  ]1 pt           SCORING TOTAL   (add numbers and type here)              (*0**)                                     A score of 3 or lower indicated LOW risk for future opioid abuse  A score of 4 to 7 indicated moderate risk for future opioid abuse  A score of 8 or higher indicates a high risk for opioid abuse

## 2023-04-19 NOTE — H&P PST ADULT - NSICDXPASTMEDICALHX_GEN_ALL_CORE_FT
PAST MEDICAL HISTORY:  Aortic valve defect aortic valve replacement 2010 (bovine)    Cervical disc disorder     Cholelithiasis     Chronic GERD     Diverticulitis     GERD (gastroesophageal reflux disease)     HLD (hyperlipidemia)     HTN (hypertension)     Lumbar disc disease     Obesity     Obesity     Pancreatitis Hospitalized 2009    Sleep apnea syndrome     Smoking history quit 2012 - 54 yrs     PAST MEDICAL HISTORY:  Aortic valve defect aortic valve replacement 2010 (bovine)    CAD (coronary artery disease)     Cervical disc disorder     Cholelithiasis     Chronic GERD     Diverticulitis     GERD (gastroesophageal reflux disease)     High cholesterol     History of COPD     HLD (hyperlipidemia)     HTN (hypertension)     Lumbar disc disease     Nicotine-filled electronic cigarette user     Obesity     Obesity     Pancreatitis Hospitalized 2009    Prostate cancer     Sleep apnea syndrome     Smoking history quit 2012 - 54 yrs

## 2023-04-19 NOTE — H&P PST ADULT - NSICDXPASTSURGICALHX_GEN_ALL_CORE_FT
PAST SURGICAL HISTORY:  Aortic valve replaced Bovine - Walters Model # [id number]  2012    Bladder cancer TURBT x3 2019    S/P colon resection 2000 - Diverticulitis     PAST SURGICAL HISTORY:  Aortic valve replaced Bovine - Walters Model # [id number]  2012    Bladder cancer TURBT x3 2019    History of cholecystectomy     S/P colon resection 2000 - Diverticulitis

## 2023-04-19 NOTE — H&P PST ADULT - NSICDXFAMILYHX_GEN_ALL_CORE_FT
FAMILY HISTORY:  FH: stroke, mother had stroke, cerebral hemorrhage.  brother had cerebral hemorrhage     FAMILY HISTORY:  FH: stroke, mother had stroke, cerebral hemorrhage.  brother had cerebral hemorrhage    Father  Still living? Unknown  FH: CAD (coronary artery disease), Age at diagnosis: Age Unknown    Mother  Still living? Unknown  Family history of blood clots, Age at diagnosis: Age Unknown  FH: CAD (coronary artery disease), Age at diagnosis: Age Unknown

## 2023-04-19 NOTE — H&P PST ADULT - PROBLEM SELECTOR PLAN 2
Continue medication.  EKG from cardiologist Dr. Johnson reviewed  from 4/12/2023 copy placed in chart  NSR with 1st degree heart block VR 76bpm  Asked the patient to consult with cardiologist about holding ASA and the patient  agreed.  Cardiac clearance pending.

## 2023-04-19 NOTE — H&P PST ADULT - PROBLEM SELECTOR PLAN 1
Labs reviewed from PCP, T&S, A1C and Pt/ptt/inr performed.  EKG from cardiologist Dr. Johnson reviewed  from 4/12/2023 copy placed in chart.  Scheduled for resection septal ulcer with septal reconstruction with mucosal graft on 4/26/2023 with Dr. Feliciano pending medical and cardiac clearance.   Patient to have medical clearance with Dr. Hess  Patient to have cardiac clearance with Dr. Jonhson  Written and verbal instructions provided.  Patient educated on surgical scrub, preadmission instructions, clearance and day of procedure medications, verbalizes understanding.  Patient instructed to stop vitamins/supplements/herbal medications/NSAIDS for one week prior to surgery and discuss with PMD, verbalized understanding.  Patient verbalized understanding of instructions and was given the opportunity to ask questions and have them answered.  Out patient medications reviewed and verified with patient.

## 2023-04-25 ENCOUNTER — TRANSCRIPTION ENCOUNTER (OUTPATIENT)
Age: 78
End: 2023-04-25

## 2023-04-26 ENCOUNTER — RESULT REVIEW (OUTPATIENT)
Age: 78
End: 2023-04-26

## 2023-04-26 ENCOUNTER — OUTPATIENT (OUTPATIENT)
Dept: INPATIENT UNIT | Facility: HOSPITAL | Age: 78
LOS: 1 days | End: 2023-04-26
Payer: MEDICARE

## 2023-04-26 ENCOUNTER — APPOINTMENT (OUTPATIENT)
Dept: OTOLARYNGOLOGY | Facility: HOSPITAL | Age: 78
End: 2023-04-26

## 2023-04-26 ENCOUNTER — TRANSCRIPTION ENCOUNTER (OUTPATIENT)
Age: 78
End: 2023-04-26

## 2023-04-26 VITALS
HEART RATE: 88 BPM | WEIGHT: 194.89 LBS | SYSTOLIC BLOOD PRESSURE: 124 MMHG | DIASTOLIC BLOOD PRESSURE: 51 MMHG | OXYGEN SATURATION: 95 % | HEIGHT: 70 IN | TEMPERATURE: 99 F | RESPIRATION RATE: 16 BRPM

## 2023-04-26 VITALS
OXYGEN SATURATION: 97 % | TEMPERATURE: 98 F | DIASTOLIC BLOOD PRESSURE: 56 MMHG | SYSTOLIC BLOOD PRESSURE: 150 MMHG | HEART RATE: 81 BPM | RESPIRATION RATE: 19 BRPM

## 2023-04-26 DIAGNOSIS — Z95.4 PRESENCE OF OTHER HEART-VALVE REPLACEMENT: Chronic | ICD-10-CM

## 2023-04-26 DIAGNOSIS — Z90.49 ACQUIRED ABSENCE OF OTHER SPECIFIED PARTS OF DIGESTIVE TRACT: Chronic | ICD-10-CM

## 2023-04-26 DIAGNOSIS — J34.0 ABSCESS, FURUNCLE AND CARBUNCLE OF NOSE: ICD-10-CM

## 2023-04-26 DIAGNOSIS — C67.9 MALIGNANT NEOPLASM OF BLADDER, UNSPECIFIED: Chronic | ICD-10-CM

## 2023-04-26 DIAGNOSIS — Z98.89 OTHER SPECIFIED POSTPROCEDURAL STATES: Chronic | ICD-10-CM

## 2023-04-26 LAB — ABO RH CONFIRMATION: SIGNIFICANT CHANGE UP

## 2023-04-26 PROCEDURE — 88311 DECALCIFY TISSUE: CPT

## 2023-04-26 PROCEDURE — 30930 THER FX NASAL INF TURBINATE: CPT

## 2023-04-26 PROCEDURE — 88304 TISSUE EXAM BY PATHOLOGIST: CPT

## 2023-04-26 PROCEDURE — 30520 REPAIR OF NASAL SEPTUM: CPT

## 2023-04-26 PROCEDURE — 88311 DECALCIFY TISSUE: CPT | Mod: 26

## 2023-04-26 PROCEDURE — 88304 TISSUE EXAM BY PATHOLOGIST: CPT | Mod: 26

## 2023-04-26 PROCEDURE — 15769 GRFG AUTOL SOFT TISS DIR EXC: CPT

## 2023-04-26 PROCEDURE — 36415 COLL VENOUS BLD VENIPUNCTURE: CPT

## 2023-04-26 PROCEDURE — C1765: CPT

## 2023-04-26 DEVICE — PURAGEL 3ML: Type: IMPLANTABLE DEVICE | Status: FUNCTIONAL

## 2023-04-26 RX ORDER — SODIUM CHLORIDE 9 MG/ML
1000 INJECTION, SOLUTION INTRAVENOUS
Refills: 0 | Status: DISCONTINUED | OUTPATIENT
Start: 2023-04-26 | End: 2023-04-27

## 2023-04-26 RX ORDER — CEFAZOLIN SODIUM 1 G
2000 VIAL (EA) INJECTION ONCE
Refills: 0 | Status: DISCONTINUED | OUTPATIENT
Start: 2023-04-26 | End: 2023-04-26

## 2023-04-26 RX ORDER — OMEPRAZOLE 10 MG/1
1 CAPSULE, DELAYED RELEASE ORAL
Qty: 0 | Refills: 0 | DISCHARGE

## 2023-04-26 RX ORDER — FENTANYL CITRATE 50 UG/ML
25 INJECTION INTRAVENOUS ONCE
Refills: 0 | Status: DISCONTINUED | OUTPATIENT
Start: 2023-04-26 | End: 2023-04-27

## 2023-04-26 RX ORDER — FINASTERIDE 5 MG/1
1 TABLET, FILM COATED ORAL
Refills: 0 | DISCHARGE

## 2023-04-26 RX ORDER — OXYCODONE HYDROCHLORIDE 5 MG/1
1 TABLET ORAL
Qty: 6 | Refills: 0
Start: 2023-04-26

## 2023-04-26 RX ORDER — ASPIRIN/CALCIUM CARB/MAGNESIUM 324 MG
1 TABLET ORAL
Qty: 0 | Refills: 0 | DISCHARGE

## 2023-04-26 RX ORDER — ONDANSETRON 8 MG/1
4 TABLET, FILM COATED ORAL ONCE
Refills: 0 | Status: DISCONTINUED | OUTPATIENT
Start: 2023-04-26 | End: 2023-04-27

## 2023-04-26 RX ORDER — INDAPAMIDE 1.25 MG
1 TABLET ORAL
Qty: 0 | Refills: 0 | DISCHARGE

## 2023-04-26 RX ORDER — AMLODIPINE BESYLATE 2.5 MG/1
1 TABLET ORAL
Qty: 0 | Refills: 0 | DISCHARGE

## 2023-04-26 RX ORDER — MUPIROCIN 20 MG/G
1 OINTMENT TOPICAL
Refills: 0 | DISCHARGE

## 2023-04-26 RX ORDER — EZETIMIBE 10 MG/1
1 TABLET ORAL
Refills: 0 | DISCHARGE

## 2023-04-26 RX ORDER — TAMSULOSIN HYDROCHLORIDE 0.4 MG/1
1 CAPSULE ORAL
Refills: 0 | DISCHARGE

## 2023-04-26 RX ORDER — POLYETHYLENE GLYCOL 3350 17 G/17G
1 POWDER, FOR SOLUTION ORAL
Qty: 0 | Refills: 0 | DISCHARGE

## 2023-04-26 RX ORDER — ACETAMINOPHEN 500 MG
975 TABLET ORAL ONCE
Refills: 0 | Status: COMPLETED | OUTPATIENT
Start: 2023-04-26 | End: 2023-04-26

## 2023-04-26 RX ORDER — ALBUTEROL 90 UG/1
2 AEROSOL, METERED ORAL
Refills: 0 | DISCHARGE

## 2023-04-26 RX ORDER — LISINOPRIL 2.5 MG/1
1 TABLET ORAL
Refills: 0 | DISCHARGE

## 2023-04-26 RX ORDER — CHOLECALCIFEROL (VITAMIN D3) 125 MCG
1 CAPSULE ORAL
Qty: 0 | Refills: 0 | DISCHARGE

## 2023-04-26 RX ADMIN — Medication 975 MILLIGRAM(S): at 11:12

## 2023-04-26 NOTE — BRIEF OPERATIVE NOTE - NSICDXBRIEFPROCEDURE_GEN_ALL_CORE_FT
PROCEDURES:  Nasal septal repair 26-Apr-2023 14:55:48 resection of septal uler with septal reonstruction with mucosal graft Mari Pagan

## 2023-04-26 NOTE — ASU DISCHARGE PLAN (ADULT/PEDIATRIC) - NS MD DC FALL RISK RISK
For information on Fall & Injury Prevention, visit: [email or web address] OR  [email or web address] OR  https://www.cdc.gov/steadi/patient.html

## 2023-04-26 NOTE — ASU DISCHARGE PLAN (ADULT/PEDIATRIC) - PAIN MANAGEMENT
Take over the counter pain medication/Prescriptions electronically submitted to pharmacy from doctor's office Take over the counter pain medication

## 2023-04-26 NOTE — ASU DISCHARGE PLAN (ADULT/PEDIATRIC) - CARE PROVIDER_API CALL
Mike Feliciano)  [facility]  [address] [place] [address]  Phone: [phone]  Fax: [phone]  Follow Up Time:    Mike Feliciano)  [facility]  [address] [place] [address]  Phone: [phone]  Fax: [phone]  Follow Up Time: 2 weeks

## 2023-04-27 PROBLEM — Z72.0 TOBACCO USE: Chronic | Status: ACTIVE | Noted: 2023-04-19

## 2023-04-27 PROBLEM — C61 MALIGNANT NEOPLASM OF PROSTATE: Chronic | Status: ACTIVE | Noted: 2023-04-19

## 2023-05-01 LAB — SURGICAL PATHOLOGY STUDY: SIGNIFICANT CHANGE UP

## 2023-05-02 ENCOUNTER — APPOINTMENT (OUTPATIENT)
Dept: OTOLARYNGOLOGY | Facility: CLINIC | Age: 78
End: 2023-05-02
Payer: MEDICARE

## 2023-05-02 VITALS
DIASTOLIC BLOOD PRESSURE: 71 MMHG | HEART RATE: 101 BPM | BODY MASS INDEX: 29.78 KG/M2 | SYSTOLIC BLOOD PRESSURE: 121 MMHG | HEIGHT: 70 IN | WEIGHT: 208 LBS

## 2023-05-02 PROCEDURE — 99024 POSTOP FOLLOW-UP VISIT: CPT

## 2023-05-02 NOTE — PROCEDURE
[FreeTextEntry6] : Nasal Endoscopy: Bilateral nasal cavity debridement (CPT 28838)\par \par Indication: \par \par Procedure: \par There was expected post operative inflammation in both the right and left nasal cavity. Thick mucoid secretions and blood clot were suctioned. \par \par Left: \par The septum is midline\par The inferior turbinate present\par The MM is clear\par The SER is clear\par \par Right: \par The septum is midline\par The inferior turbinate was well reduced/outfractured\par There was massive crusting along the floor and septum. This was removed with suctino and forceps. The mucosal site is healing appropriately, although the graft did not seem to adhere and I think was excised with the crust. \par \par Stents removed bilaterally

## 2023-05-02 NOTE — REASON FOR VISIT
[Post-Operative Visit] : a post-operative visit [FreeTextEntry2] : s/p 4/26/23 nasal septal ulcer with septal reconstruction with mucosal graft

## 2023-05-02 NOTE — ASSESSMENT
[FreeTextEntry1] : 78M with R nasal septal ulcer, doing well post op. Splints and significant crusts were removed.

## 2023-05-02 NOTE — HISTORY OF PRESENT ILLNESS
[de-identified] : 78M s/p 4/26/23 nasal septal ulcer with septal reconstruction with mucosal graft. Unable to breathe through nose and unable to sleep. Using sinus rinses but without salt as has been burning

## 2023-05-11 ENCOUNTER — APPOINTMENT (OUTPATIENT)
Dept: OTOLARYNGOLOGY | Facility: CLINIC | Age: 78
End: 2023-05-11
Payer: MEDICARE

## 2023-05-11 VITALS
HEIGHT: 70 IN | SYSTOLIC BLOOD PRESSURE: 136 MMHG | HEART RATE: 87 BPM | WEIGHT: 208 LBS | DIASTOLIC BLOOD PRESSURE: 67 MMHG | BODY MASS INDEX: 29.78 KG/M2

## 2023-05-11 PROBLEM — I25.10 ATHEROSCLEROTIC HEART DISEASE OF NATIVE CORONARY ARTERY WITHOUT ANGINA PECTORIS: Chronic | Status: ACTIVE | Noted: 2023-04-19

## 2023-05-11 PROBLEM — Z87.09 PERSONAL HISTORY OF OTHER DISEASES OF THE RESPIRATORY SYSTEM: Chronic | Status: ACTIVE | Noted: 2023-04-19

## 2023-05-11 PROBLEM — E78.00 PURE HYPERCHOLESTEROLEMIA, UNSPECIFIED: Chronic | Status: ACTIVE | Noted: 2023-04-19

## 2023-05-11 PROCEDURE — 99024 POSTOP FOLLOW-UP VISIT: CPT

## 2023-05-11 NOTE — HISTORY OF PRESENT ILLNESS
[de-identified] : 78M presents for a follow up visit s/p resection of nasal septal ulcer excision with sepal reconstruction with mucosal graft 4/26/23. Patient still has difficulty breathing through nose, especially in the morning. Still with some tenderness around the lateral wall and tip of nose, however, this is all improved from last week.\par \par

## 2023-05-11 NOTE — PROCEDURE
[FreeTextEntry6] : Nasal Endoscopy: Bilateral nasal cavity debridement (CPT 97072)\par \par Indication: post op form resection ulcer, graft\par \par Procedure: \par There was expected post operative appearance. \par \par Left: \par The septum is midline\par The inferior turbinate is outfractured \par The MM is clear\par The SER is clear\par \par Right: \par The septum is midline\par The inferior turbinate was well reduced/outfractured\par There was crusting along the turbinate which was removed with suction and forceps\par There was minimal crusting of the septal ulcer site, which is healing appropriately \par

## 2023-05-11 NOTE — ASSESSMENT
[FreeTextEntry1] : 78M presents for a follow up visit s/p resection of nasal septal ulcer excision with sepal reconstruction with mucosal graft 4/26/23.

## 2023-05-11 NOTE — REASON FOR VISIT
[Subsequent Evaluation] : a subsequent evaluation for [FreeTextEntry2] : s/p resection of nasal septal ulcer excision with sepal reconstruction with mucosal graft 4/26/23

## 2023-05-19 NOTE — PATIENT PROFILE ADULT - FALL HARM RISK
----- Message from Telma Rogers sent at 5/17/2023  3:53 PM CDT -----  Regarding: MRI orders  I am reaching out to see if this patient still needs to come in for MRI Brain? Orders were sent back in February and we were not able to reach patient. Please let me know if we need to try and schedule.     
Patient called and she stated that she went there and facility stated that they didn't have any orders. I informed patient that I will contact facility to give her a call and schedule. Patient stated that she will wait for the call.  
surgery

## 2023-05-30 ENCOUNTER — APPOINTMENT (OUTPATIENT)
Dept: OTOLARYNGOLOGY | Facility: CLINIC | Age: 78
End: 2023-05-30
Payer: MEDICARE

## 2023-05-30 VITALS
HEIGHT: 70 IN | HEART RATE: 60 BPM | DIASTOLIC BLOOD PRESSURE: 65 MMHG | BODY MASS INDEX: 29.78 KG/M2 | WEIGHT: 208 LBS | SYSTOLIC BLOOD PRESSURE: 117 MMHG

## 2023-05-30 PROCEDURE — 99024 POSTOP FOLLOW-UP VISIT: CPT

## 2023-05-30 PROCEDURE — 31237 NSL/SINS NDSC SURG BX POLYPC: CPT | Mod: 78,RT

## 2023-05-30 RX ORDER — ALBUTEROL SULFATE 90 UG/1
108 (90 BASE) INHALANT RESPIRATORY (INHALATION)
Qty: 8 | Refills: 0 | Status: COMPLETED | COMMUNITY
Start: 2021-12-01 | End: 2023-05-30

## 2023-05-30 NOTE — REASON FOR VISIT
[Post-Operative Visit] : a post-operative visit [FreeTextEntry2] : s/p resection of nasal septal ulcer excision with sepal reconstruction with mucosal graft 4/26/23

## 2023-05-30 NOTE — HISTORY OF PRESENT ILLNESS
[de-identified] : 78M presents for a follow up visit s/p resection of nasal septal ulcer excision with sepal reconstruction with mucosal graft 4/26/23. Patient reports still having some crusting in R anterior nare, no bleeding noted. Nasal congestion significantly improved. Denies pain.\par \par

## 2023-05-30 NOTE — PROCEDURE
[FreeTextEntry6] : Nasal Endoscopy: Bilateral nasal cavity debridement (CPT 71769)\par \par Indication: post op from resection ulcer, graft\par \par Procedure: \par There was expected post operative appearance. \par \par Left: \par The septum is midline\par The inferior turbinate is outfractured \par The MM is clear\par The SER is clear\par \par Right: \par The septum is midline\par The inferior turbinate was well reduced/outfractured\par The nasal floor free mucosal graft is well healed\par There was crusting along the turbinate which was removed with suction and forceps\par There was minimal crusting of the septal ulcer site, mostly around a residual stitch. This was cut flush with the septum. \par The graft is wel lintegrated. There is a slight ridge inferiorly that has not fully remuosalized yet\par

## 2023-07-20 ENCOUNTER — APPOINTMENT (OUTPATIENT)
Dept: OTOLARYNGOLOGY | Facility: CLINIC | Age: 78
End: 2023-07-20
Payer: MEDICARE

## 2023-07-20 VITALS
HEIGHT: 70 IN | HEART RATE: 87 BPM | DIASTOLIC BLOOD PRESSURE: 63 MMHG | BODY MASS INDEX: 29.78 KG/M2 | WEIGHT: 208 LBS | SYSTOLIC BLOOD PRESSURE: 114 MMHG

## 2023-07-20 PROCEDURE — 99213 OFFICE O/P EST LOW 20 MIN: CPT | Mod: 25

## 2023-07-20 RX ORDER — LIDOCAINE/EPINEPHR/TETRACAINE 4-0.09-0.5
4-0.09-0.5 GEL WITH PREFILLED APPLICATOR (ML) TOPICAL
Qty: 1 | Refills: 0 | Status: ACTIVE | COMMUNITY
Start: 2023-07-20 | End: 1900-01-01

## 2023-07-20 NOTE — PROCEDURE
Problem: Diabetes  Goal: Glycemic balance achieved/maintained  Description: Goal is to maintain blood sugar within range with no episodes of hypoglycemia  Outcome: Outcome Met, Continue evaluating goal progress toward completion  Goal: Verbalizes/demonstrates understanding of Diabetes  Description: Document on Patient Education Activity  Outcome: Outcome Met, Continue evaluating goal progress toward completion  Goal: Demonstrates ability to self-administer insulin  Description: Document on Patient Education Activity  Outcome: Outcome Met, Continue evaluating goal progress toward completion     Problem: At Risk for Falls  Goal: # Patient does not fall  Outcome: Outcome Met, Continue evaluating goal progress toward completion  Goal: # Takes action to control fall-related risks  Outcome: Outcome Met, Continue evaluating goal progress toward completion  Goal: # Verbalizes understanding of fall risk/precautions  Description: Document education using the patient education activity  Outcome: Outcome Met, Continue evaluating goal progress toward completion     Problem: Dysphagia  Goal: # Exhibits no s/s of aspiration  Description: Symptoms of aspiration include coughing, choking, throat clearing, change in voice quality, and/or a decrease in oxygen saturation by more than 2% points from baseline after swallowing.  Outcome: Outcome Met, Continue evaluating goal progress toward completion  Goal: # Verbalizes understanding of dysphagia management  Description: Document education using the patient education activity.  Outcome: Outcome Met, Continue evaluating goal progress toward completion     Problem: Pain  Goal: #Acceptable pain level achieved/maintained at rest using NRS/Faces  Description: This goal is used for patients who can self-report.  Acceptable means the level is at or below the identified comfort/function goal.  Outcome: Outcome Met, Continue evaluating goal progress toward completion  Goal: # Acceptable pain  level achieved/maintained at rest using NRS/Faces without oversedation (opioid naive or PCA/Epidural infusion)  Description: This goal is used if Opioid-naïve or on PCA/Epidural Infusion.  Outcome: Outcome Met, Continue evaluating goal progress toward completion  Goal: # Acceptable pain level achieved/maintained with activity using NRS/Faces  Description: This goal is used for patients who can self-report and are not achieving acceptable pain control during activity.  Outcome: Outcome Met, Continue evaluating goal progress toward completion  Goal: Acceptable pain/comfort level is achieved/maintained at rest (based on Pain Behaviors Scale)  Description: This goal is used for patients who are not able to self-report pain and are assessed for pain using the Pain Behaviors Scale  Outcome: Outcome Met, Continue evaluating goal progress toward completion  Goal: Acceptable pain/comfort level is achieved/maintained at rest based on PAINAID scale (Dementia)  Description: This goal is used for patients who are not able to self-report pain, have dementia, and assessed using the PAINAD scale.  Outcome: Outcome Met, Continue evaluating goal progress toward completion  Goal: Acceptable pain/comfort level is achieved/maintained at rest (based on pediatric behavior tool: NIPS, NPASS, or FLACC)  Description: This goal is used for pediatric patients who are not able to self report pain.  Outcome: Outcome Met, Continue evaluating goal progress toward completion  Goal: # Verbalizes understanding of pain management  Description: Documented in Patient Education Activity  Outcome: Outcome Met, Continue evaluating goal progress toward completion     Problem: Stroke: Ischemic (Transient/Permanent)  Goal: Neurological status is maintained/restored to status at baseline  Description: Monitor neurological and mental status including symptoms of increasing intracranial pressure (headache, nausea/vomiting, or change in behavior). Hypertension  (greater than 180 systolic) may also indicate a change in status related to stroke.  Outcome: Outcome Met, Continue evaluating goal progress toward completion  Goal: Normal temperature is maintained  Outcome: Outcome Met, Continue evaluating goal progress toward completion  Goal: Elimination status is maintained/returned to baseline  Description: Remove indwelling urinary catheter as soon as possible or collaborate with provider for order/reason for continued use.   Outcome: Outcome Met, Continue evaluating goal progress toward completion  Goal: #Depressive s/s (self-reported/observed) are recognized and monitored  Outcome: Outcome Met, Continue evaluating goal progress toward completion  Goal: Personal stroke risk factors are identified with initial plan for risk reduction  Description: Stroke risk reduction involves taking medication, changing diet, increasing physical exercise, smoking cessation, or alcohol/drug use reduction/cessation based on identified risks.  Outcome: Outcome Met, Continue evaluating goal progress toward completion  Goal: Verbalizes understanding of condition and treatment plan  Description: Document on Patient Education Activity  Outcome: Outcome Met, Continue evaluating goal progress toward completion      [FreeTextEntry6] : Nasal Endoscopy: Bilateral nasal cavity debridement (CPT 33153)\par \par Indication: post op from resection ulcer, graft\par \par Procedure: \par There was expected post operative appearance. \par \par Left: \par The septum is midline\par The inferior turbinate is outfractured \par The MM is clear\par The SER is clear\par \par Right: \par The septum is midline\par The inferior turbinate was well reduced/outfractured\par The nasal floor free mucosal graft is well healed\par Septal ulcer site well healed, the graft is well integrated\par Small crust over inferior aspect of graft site removed with forceps\par \par Pt responds well to modified misha\par

## 2023-07-20 NOTE — HISTORY OF PRESENT ILLNESS
[de-identified] : 78M presenting s/p excision of nasal septal ulcer with sepal reconstruction with mucosal graft 4/26/23. He is feeling well, still using ayr gel and lavage. Pain and congesiton are resolved. however, his residual R sided nasal congestion persists.

## 2023-08-22 ENCOUNTER — APPOINTMENT (OUTPATIENT)
Dept: OTOLARYNGOLOGY | Facility: CLINIC | Age: 78
End: 2023-08-22

## 2023-10-25 PROBLEM — R09.82 POST-NASAL DRIP: Status: ACTIVE | Noted: 2023-03-21

## 2023-10-26 ENCOUNTER — APPOINTMENT (OUTPATIENT)
Dept: OTOLARYNGOLOGY | Facility: CLINIC | Age: 78
End: 2023-10-26
Payer: MEDICARE

## 2023-10-26 DIAGNOSIS — R09.82 POSTNASAL DRIP: ICD-10-CM

## 2023-10-26 DIAGNOSIS — R05.9 COUGH, UNSPECIFIED: ICD-10-CM

## 2023-10-26 PROCEDURE — 99213 OFFICE O/P EST LOW 20 MIN: CPT | Mod: 25

## 2023-10-26 PROCEDURE — 31231 NASAL ENDOSCOPY DX: CPT

## 2023-12-14 ENCOUNTER — APPOINTMENT (OUTPATIENT)
Dept: OTOLARYNGOLOGY | Facility: CLINIC | Age: 78
End: 2023-12-14

## 2024-01-09 ENCOUNTER — APPOINTMENT (OUTPATIENT)
Dept: OTOLARYNGOLOGY | Facility: CLINIC | Age: 79
End: 2024-01-09
Payer: MEDICARE

## 2024-01-09 VITALS
BODY MASS INDEX: 27.77 KG/M2 | WEIGHT: 194 LBS | HEIGHT: 70 IN | HEART RATE: 96 BPM | SYSTOLIC BLOOD PRESSURE: 135 MMHG | DIASTOLIC BLOOD PRESSURE: 65 MMHG

## 2024-01-09 PROCEDURE — 30465 REPAIR NASAL STENOSIS: CPT

## 2024-01-09 PROCEDURE — 99214 OFFICE O/P EST MOD 30 MIN: CPT | Mod: 25

## 2024-01-09 NOTE — ASSESSMENT
[FreeTextEntry1] : 78M doing very well after grafting for nasal septal ulcer. Tolerated nasal valve suspension suture well today.

## 2024-01-09 NOTE — PROCEDURE
[FreeTextEntry6] : Nasal Endoscopy: Bilateral nasal cavity debridement (CPT 84164)  Indication: post op from resection ulcer, graft  Procedure:  There was expected post operative appearance.   Left:  did not scope  Right:  The septum is slightly bowing right The inferior turbinate was well reduced/outfractured The nasal floor free mucosal graft is well healed Septal ulcer site well healed, the graft is well integrated. No crusting  Procedure: informed consent was obtained. the right premalar soft tissue was infiltrated with 2.5 cc lidocaine 1:100,000 epinephrine. The head of the anterior turbinate was injected with 0.5cc of the same local anesthetic. After adequate time, an 18G spinal needle was passed through the malar soft tissue into the nasal valve. a 5-0 PDS was then passed through the needle. The needle was withdrawn into the soft tissue and readvanced back into the valve across the region of collapse. The 5-0 PDS was passed back through and tied in the nose. The spinal needle was withdrawn. Hemostasis was achieved with pressure. Patient tolerated well.

## 2024-01-09 NOTE — PHYSICAL EXAM
[Nasal Endoscopy Performed] : nasal endoscopy was performed, see procedure section for findings [Normal] :  tongue is normal [de-identified] : dynamic valve collapse

## 2024-01-09 NOTE — HISTORY OF PRESENT ILLNESS
[de-identified] : Update 1/9/24: f/u s/p excision of nasal septal ulcer with sepal reconstruction with mucosal graft 4/26/23. Here today for nasal valve procedure. Reports continued R nasal congestion.  Update 10/25/23: s/p excision of nasal septal ulcer with sepal reconstruction with mucosal graft 4/26/23. Recently had multiple falls. He reports that on the R side of his nose feels crust. His breathing is still restricted on R, but not as bad as it had been in past. He is using the ayr spray  78M presenting s/p excision of nasal septal ulcer with sepal reconstruction with mucosal graft 4/26/23. He is feeling well, still using ayr gel and lavage. Pain and congesiton are resolved. however, his residual R sided nasal congestion persists.

## 2024-02-20 ENCOUNTER — APPOINTMENT (OUTPATIENT)
Dept: OTOLARYNGOLOGY | Facility: CLINIC | Age: 79
End: 2024-02-20
Payer: MEDICARE

## 2024-02-20 VITALS
HEART RATE: 98 BPM | SYSTOLIC BLOOD PRESSURE: 87 MMHG | DIASTOLIC BLOOD PRESSURE: 51 MMHG | BODY MASS INDEX: 26.48 KG/M2 | WEIGHT: 185 LBS | HEIGHT: 70 IN

## 2024-02-20 PROCEDURE — 99213 OFFICE O/P EST LOW 20 MIN: CPT | Mod: 25

## 2024-02-20 PROCEDURE — 31231 NASAL ENDOSCOPY DX: CPT | Mod: 78

## 2024-02-20 RX ORDER — AMOXICILLIN AND CLAVULANATE POTASSIUM 875; 125 MG/1; MG/1
875-125 TABLET, COATED ORAL
Qty: 14 | Refills: 0 | Status: COMPLETED | COMMUNITY
Start: 2024-01-09 | End: 2024-02-20

## 2024-02-20 NOTE — PROCEDURE
[FreeTextEntry6] : Nasal Endoscopy: Bilateral nasal cavity debridement (CPT 18172)  Indication: post op from resection ulcer, graft  Procedure:  There was expected post operative appearance.   Left:  The septum is midline, very dry  The inferior turbinate is outfractured The MM is clear, no pus noted  The SER is clear  Right:  The septum is slightly bowing right The inferior turbinate was well reduced/outfractured The nasal floor free mucosal graft is well healed MM without pus Septal ulcer site well healed, the graft is well integrated. No crusting

## 2024-02-20 NOTE — ASSESSMENT
[FreeTextEntry1] : 79M doing very well after grafting for nasal septal ulcer. Now with nasal congestion/headache and intractable diarrhea.

## 2024-02-20 NOTE — HISTORY OF PRESENT ILLNESS
[de-identified] : Update 2/20/24: s/p resection of nasal septal ulcer excision with sepal reconstruction with mucosal graft 4/26/23. Previously had in-office R nasal valve suspension suture. He had to go to urgent care for headaches/sinus pressure and congestion starting one week prior. He was given doxycycline but has caused diarrhea. Stopped PPI which he feels may have exacerbated it. He used lavage and green mucus came out yesterday. He has had ongoing diarrhea even after stopping doxy. He is using an over the counter cold/sinus pill, which helps, but he thinks may have caused some GI upset.  Update 1/9/24: f/u s/p excision of nasal septal ulcer with sepal reconstruction with mucosal graft 4/26/23. Here today for nasal valve procedure. Reports continued R nasal congestion.  Update 10/25/23: s/p excision of nasal septal ulcer with sepal reconstruction with mucosal graft 4/26/23. Recently had multiple falls. He reports that on the R side of his nose feels crust. His breathing is still restricted on R, but not as bad as it had been in past. He is using the ayr spray  78M presenting s/p excision of nasal septal ulcer with sepal reconstruction with mucosal graft 4/26/23. He is feeling well, still using ayr gel and lavage. Pain and congesiton are resolved. however, his residual R sided nasal congestion persists.

## 2024-02-20 NOTE — PHYSICAL EXAM
[Nasal Endoscopy Performed] : nasal endoscopy was performed, see procedure section for findings [Normal] :  tongue is normal [de-identified] : dynamic valve collapse

## 2024-02-20 NOTE — PLAN
[TextEntry] : We will hold off from antibiotics due to his diarrhea but will start with MDP for now. I recommend that he speak with his PCP about possible c. diff infection as his diarrhea has persisted after stopping antibiotics last week.  I have asked that he please keep me looped in. I would like his diarrhea sorted out, possibly infectious diarrhea, prior to further discussion of his sinuses.

## 2024-05-07 ENCOUNTER — APPOINTMENT (OUTPATIENT)
Dept: OTOLARYNGOLOGY | Facility: CLINIC | Age: 79
End: 2024-05-07
Payer: MEDICARE

## 2024-05-07 VITALS
WEIGHT: 194 LBS | BODY MASS INDEX: 27.77 KG/M2 | DIASTOLIC BLOOD PRESSURE: 53 MMHG | HEIGHT: 70 IN | HEART RATE: 105 BPM | SYSTOLIC BLOOD PRESSURE: 101 MMHG

## 2024-05-07 DIAGNOSIS — J34.0 ABSCESS, FURUNCLE AND CARBUNCLE OF NOSE: ICD-10-CM

## 2024-05-07 DIAGNOSIS — M95.0 ACQUIRED DEFORMITY OF NOSE: ICD-10-CM

## 2024-05-07 PROCEDURE — 31231 NASAL ENDOSCOPY DX: CPT

## 2024-05-07 PROCEDURE — 99213 OFFICE O/P EST LOW 20 MIN: CPT | Mod: 25

## 2024-05-07 RX ORDER — CHLORHEXIDINE GLUCONATE, 0.12% ORAL RINSE 1.2 MG/ML
0.12 SOLUTION DENTAL
Qty: 473 | Refills: 0 | Status: COMPLETED | COMMUNITY
Start: 2022-05-08 | End: 2024-05-07

## 2024-05-07 RX ORDER — BUDESONIDE, GLYCOPYRROLATE, AND FORMOTEROL FUMARATE 160; 9; 4.8 UG/1; UG/1; UG/1
160-9-4.8 AEROSOL, METERED RESPIRATORY (INHALATION)
Qty: 11 | Refills: 0 | Status: COMPLETED | COMMUNITY
Start: 2022-01-05 | End: 2024-05-07

## 2024-05-07 RX ORDER — UMECLIDINIUM BROMIDE AND VILANTEROL TRIFENATATE 62.5; 25 UG/1; UG/1
POWDER RESPIRATORY (INHALATION)
Refills: 0 | Status: ACTIVE | COMMUNITY

## 2024-05-07 RX ORDER — METHYLPREDNISOLONE 4 MG/1
4 TABLET ORAL
Qty: 1 | Refills: 0 | Status: COMPLETED | COMMUNITY
Start: 2024-02-20 | End: 2024-05-07

## 2024-05-07 RX ORDER — ALBUTEROL SULFATE 90 UG/1
108 (90 BASE) INHALANT RESPIRATORY (INHALATION)
Qty: 8 | Refills: 0 | Status: COMPLETED | COMMUNITY
Start: 2021-12-01 | End: 2024-05-07

## 2024-05-07 RX ORDER — CLONIDINE 0.3 MG/24H
PATCH, EXTENDED RELEASE TRANSDERMAL
Refills: 0 | Status: ACTIVE | COMMUNITY

## 2024-05-07 RX ORDER — BUDESONIDE 0.5 MG/2ML
0.5 INHALANT ORAL TWICE DAILY
Qty: 1 | Refills: 3 | Status: COMPLETED | COMMUNITY
Start: 2023-10-26 | End: 2024-05-07

## 2024-05-07 NOTE — PHYSICAL EXAM
[Nasal Endoscopy Performed] : nasal endoscopy was performed, see procedure section for findings [Normal] :  tongue is normal [de-identified] : dynamic valve collapse

## 2024-05-07 NOTE — HISTORY OF PRESENT ILLNESS
[de-identified] : Update 5/7/24: s/p resection of nasal septal ulcer excision with sepal reconstruction with mucosal graft 4/26/23. Had R nasal valve retention suture in office for R NVC 1/9/24. Had several weeks of benefit, but then congestion returned to baseline. Previously with diarrhea, found to have covid, now resolved.  Using saline gels regularly.   Update 2/20/24: s/p resection of nasal septal ulcer excision with sepal reconstruction with mucosal graft 4/26/23. Previously had in-office R nasal valve suspension suture. He had to go to urgent care for headaches/sinus pressure and congestion starting one week prior. He was given doxycycline but has caused diarrhea. Stopped PPI which he feels may have exacerbated it. He used lavage and green mucus came out yesterday. He has had ongoing diarrhea even after stopping doxy. He is using an over the counter cold/sinus pill, which helps, but he thinks may have caused some GI upset.  Update 1/9/24: f/u s/p excision of nasal septal ulcer with sepal reconstruction with mucosal graft 4/26/23. Here today for nasal valve procedure. Reports continued R nasal congestion.  Update 10/25/23: s/p excision of nasal septal ulcer with sepal reconstruction with mucosal graft 4/26/23. Recently had multiple falls. He reports that on the R side of his nose feels crust. His breathing is still restricted on R, but not as bad as it had been in past. He is using the ayr spray  78M presenting s/p excision of nasal septal ulcer with sepal reconstruction with mucosal graft 4/26/23. He is feeling well, still using ayr gel and lavage. Pain and congesiton are resolved. however, his residual R sided nasal congestion persists.

## 2024-05-07 NOTE — PLAN
[TextEntry] : We discussed options for his nasal obstruction on the right including vivaer for the narrowed valve, vs formal septoplasty. He is hesitant for aggressive nasal sprays given his previous issues. 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    He would like to try the vivaer procedure but has other procedures he needs to do beforehand. We will submit for insurance. 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  Follow up in 3 months.

## 2024-05-07 NOTE — ASSESSMENT
[FreeTextEntry1] : 79M doing very well after grafting for nasal septal ulcer, now with persistent nasal congestion R>L.

## 2024-05-07 NOTE — PROCEDURE
[FreeTextEntry6] : Nasal Endoscopy  Indication: post op from resection ulcer, graft  Left:  The septum is midline The inferior turbinate is outfractured The MM is clear, no pus noted  The SER is clear  Right:  The septum is slightly bowing right,particularly up high The inferior turbinate was well reduced/outfractured The nasal floor free mucosal graft is well healed MM without pus Septal ulcer site well healed, the graft is well integrated. No crusting

## 2024-08-06 ENCOUNTER — APPOINTMENT (OUTPATIENT)
Dept: OTOLARYNGOLOGY | Facility: CLINIC | Age: 79
End: 2024-08-06

## 2024-10-16 NOTE — ASU PATIENT PROFILE, ADULT - NPO AFTER
[FreeTextEntry1] : RIGHT L3-L4, L4-L5 transforaminal epidural steroid injection [FreeTextEntry2] : chronic lumbar radiculopathy   23:00 [FreeTextEntry3] : Procedure Date: 10/16/2024   Preoperative Diagnosis: chronic lumbar radiculopathy   Procedure: RIGHT L3-L4, L4-L5 transforaminal epidural steroid injection under fluoroscopic guidance  Anesthesia: local  Complications: none   EBL: none   Procedure in detail:   Patient was seen and examined. Risks, benefits, and alternatives for the procedure were discussed with the patient in detail. The patient expressed understanding, gave written and verbal consent, and placed themselves in a prone position on the procedure table. Skin overlying the lumbosacral spine was prepped with chloraprep and draped in the usual sterile fashion. Fluoroscopic images were obtained to identify the L3 and L4 vertebral bodies. Target was the 6 o'clock position under the RIGHT pedicle at the L3 and L4 vertebral level. Skin overlying the target was marked and infiltrated with 1% lidocaine. Using two 22 gauge, 3.5 inch spinal needles, these were inserted and advanced under intermittent fluoroscopic guidance. When felt to be engaged in the epidural space, lateral view was used to confirm depth. After negative aspiration for heme/csf, contrast was injected under live fluoroscopy, which showed contrast spread consistent with epidural flow. No evidence of intravascular or intrathecal uptake. At this point, a total of 2ml of injectate, which consisted of 1ml of 6mg/ml betamethasone and 1ml of normal saline were injected through each needle. The needles were restyletted and withdrawn, a band aid was placed over the injection site. Patient tolerated the procedure well. The patient recovered uneventfully and was discharged home in stable condition.

## 2025-01-08 ENCOUNTER — APPOINTMENT (OUTPATIENT)
Age: 80
End: 2025-01-08

## 2025-01-22 ENCOUNTER — APPOINTMENT (OUTPATIENT)
Age: 80
End: 2025-01-22

## 2025-02-17 PROBLEM — J34.829 NASAL VALVE COLLAPSE: Status: ACTIVE | Noted: 2024-01-09

## 2025-02-18 ENCOUNTER — APPOINTMENT (OUTPATIENT)
Dept: OTOLARYNGOLOGY | Facility: CLINIC | Age: 80
End: 2025-02-18
Payer: MEDICARE

## 2025-02-18 VITALS
WEIGHT: 189 LBS | BODY MASS INDEX: 27.06 KG/M2 | HEART RATE: 116 BPM | DIASTOLIC BLOOD PRESSURE: 63 MMHG | HEIGHT: 70 IN | SYSTOLIC BLOOD PRESSURE: 120 MMHG

## 2025-02-18 DIAGNOSIS — J30.0 VASOMOTOR RHINITIS: ICD-10-CM

## 2025-02-18 DIAGNOSIS — J34.829 NASAL VALVE COLLAPSE, UNSPECIFIED: ICD-10-CM

## 2025-02-18 DIAGNOSIS — J34.0 ABSCESS, FURUNCLE AND CARBUNCLE OF NOSE: ICD-10-CM

## 2025-02-18 PROCEDURE — 31231 NASAL ENDOSCOPY DX: CPT

## 2025-02-18 PROCEDURE — 99213 OFFICE O/P EST LOW 20 MIN: CPT | Mod: 25

## 2025-02-18 RX ORDER — IPRATROPIUM BROMIDE 21 UG/1
0.03 SPRAY NASAL 3 TIMES DAILY
Qty: 1 | Refills: 3 | Status: ACTIVE | COMMUNITY
Start: 2025-02-18 | End: 1900-01-01
